# Patient Record
Sex: MALE | Race: WHITE | NOT HISPANIC OR LATINO | Employment: OTHER | URBAN - METROPOLITAN AREA
[De-identification: names, ages, dates, MRNs, and addresses within clinical notes are randomized per-mention and may not be internally consistent; named-entity substitution may affect disease eponyms.]

---

## 2017-01-12 ENCOUNTER — OFFICE VISIT (OUTPATIENT)
Dept: NEUROLOGY | Facility: CLINIC | Age: 82
End: 2017-01-12
Payer: MEDICARE

## 2017-01-12 VITALS — HEIGHT: 73 IN | BODY MASS INDEX: 28.49 KG/M2 | RESPIRATION RATE: 20 BRPM | WEIGHT: 215 LBS

## 2017-01-12 DIAGNOSIS — R26.9 GAIT DIFFICULTY: Primary | ICD-10-CM

## 2017-01-12 DIAGNOSIS — R41.81 AGE-RELATED COGNITIVE DECLINE: ICD-10-CM

## 2017-01-12 PROCEDURE — 99213 OFFICE O/P EST LOW 20 MIN: CPT | Mod: PBBFAC,PN | Performed by: PSYCHIATRY & NEUROLOGY

## 2017-01-12 PROCEDURE — 99204 OFFICE O/P NEW MOD 45 MIN: CPT | Mod: S$PBB,,, | Performed by: PSYCHIATRY & NEUROLOGY

## 2017-01-12 PROCEDURE — 99999 PR PBB SHADOW E&M-EST. PATIENT-LVL III: CPT | Mod: PBBFAC,,, | Performed by: PSYCHIATRY & NEUROLOGY

## 2017-01-12 NOTE — PROGRESS NOTES
Subjective:       Patient ID: Toni Uriarte is a 86 y.o. male.    Chief Complaint: Gait Problem (Not good balance); Headache; and Memory Loss (confusion)    HPI  The patient is a 87 y/o male presenting with 3 different symptoms. He first describes a very brief, short lasting, jabbing pain from front to back. There are no particular triggers nor ameliorating or worsening factors. He also complains of balance problems particularly when bending over. He practices squatting daily but this is not enough to control the tendency to fall when leaning forward. Lastly, he states that is having memory difficulty. His daughter accompanies him and reiterates the problems with memory. He has diagnoses of orthostatic dizziness, femoral and tibial artery stenosis, degenerative disease of the lumbar spine with very good response to Physical Therapy in the past.     Review of Systems   Constitutional: Negative for activity change, appetite change, chills, fatigue and fever.   HENT: Positive for hearing loss. Negative for congestion, dental problem, ear pain, sinus pressure, tinnitus, trouble swallowing and voice change.    Eyes: Positive for pain. Negative for photophobia and visual disturbance.   Respiratory: Negative for cough, chest tightness and shortness of breath.    Cardiovascular: Negative for chest pain, palpitations and leg swelling.   Gastrointestinal: Negative for abdominal pain, blood in stool, constipation, diarrhea, nausea and vomiting.   Endocrine: Positive for polyuria. Negative for cold intolerance and heat intolerance.   Genitourinary: Positive for frequency. Negative for difficulty urinating, dysuria and urgency.   Musculoskeletal: Negative for arthralgias, back pain, gait problem, myalgias, neck pain and neck stiffness.   Skin: Negative for color change, pallor and rash.   Neurological: Positive for dizziness and light-headedness. Negative for tremors, seizures, syncope, facial asymmetry, speech  difficulty, weakness, numbness and headaches.   Hematological: Negative for adenopathy. Does not bruise/bleed easily.   Psychiatric/Behavioral: Positive for confusion. Negative for agitation, behavioral problems, decreased concentration, self-injury, sleep disturbance and suicidal ideas. The patient is not nervous/anxious.          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    Urinary incontinence      occasional leakage of urine     Past Surgical History   Procedure Laterality Date    Gallbladder surgery  2007    Cataract extraction Bilateral     Tonsillectomy      Cholecystectomy      Eye surgery      Circumcision      Hemorrhoid surgery      Prostate surgery       Family History   Problem Relation Age of Onset    Cancer Mother      breast    Heart disease Mother     Heart disease Father     Stroke Father     Alzheimer's disease Brother     Arthritis Brother      back and neck     Social History     Social History    Marital status:      Spouse name: N/A    Number of children: N/A    Years of education: N/A     Occupational History    Not on file.     Social History Main Topics    Smoking status: Former Smoker     Quit date: 12/5/1985    Smokeless tobacco: Never Used    Alcohol use 0.0 oz/week     0 Standard drinks or equivalent per week      Comment: gin and tonic regularly    Drug use: No    Sexual activity: Not on file     Other Topics Concern    Not on file     Social History Narrative     Review of patient's allergies indicates:   Allergen Reactions    Demerol [meperidine] Other (See Comments)     arrest       Current Outpatient  Prescriptions:     aspirin (ECOTRIN) 81 MG EC tablet, Take 81 mg by mouth once daily.  , Disp: , Rfl:     co-enzyme Q-10 30 mg capsule, Take 30 mg by mouth 3 (three) times daily., Disp: , Rfl:     docusate sodium (COLACE) 100 MG capsule, Take 1 capsule (100 mg total) by mouth 2 (two) times daily as needed for Constipation., Disp: , Rfl: 0    gabapentin (NEURONTIN) 300 MG capsule, TAKE 1 CAPSULE(300 MG) BY MOUTH THREE TIMES DAILY, Disp: 270 capsule, Rfl: 1    LACTOBACILLUS ACIDOPHILUS (PROBIOTIC ORAL), Take by mouth., Disp: , Rfl:     levothyroxine (SYNTHROID) 100 MCG tablet, Take 1 tablet (100 mcg total) by mouth once daily., Disp: 90 tablet, Rfl: 3    metoclopramide HCl (REGLAN) 10 MG tablet, TAKE 1 TABLET TWICE DAILY, Disp: 180 tablet, Rfl: 0    multivitamin capsule, Take 1 capsule by mouth once daily.  , Disp: , Rfl:     omeprazole (PRILOSEC) 20 MG capsule, TAKE 2 CAPSULES BY MOUTH ONCE DAILY, Disp: 60 capsule, Rfl: 0      Objective:      Vitals:    01/12/17 0914   Resp: 20     Body mass index is 28.37 kg/(m^2).    Physical Exam    Constitutional:     He appears well-developed and well-nourished. He is well groomed  HENT:    Head: Atraumatic, oral and nasal mucosa intact  Eyes: Conjunctivae and EOM are normal. Pupils are equal, round, and reactive to light OU  Neck: Neck supple. No thyromegaly present  Cardiovascular: Normal rate and normal heart sounds  No murmur heard  Pulmonary/Chest: Effort normal and breath sounds normal  Musculoskeletal: Normal range of motion. No joint stiffness. No vertebral point tenderness  Skin: Skin is warm and dry  Psychiatric: Normal mood and affect     Neuro exam:  MOCA score 20/30  Mental status:  Awake, attentive, Alert, oriented to self, place, year and month  Language function is intact  Naming, repetition and spontaneous meaningful speech expression are intact    Cranial Nerves:  Smell was not formally evaluated  Cranial Nerves II - XII: intact  Pursuits were smooth,  normal saccades, no nystagmus OU  Funduscopic exam - disc were flat and pink, no exudates or hemorrhages OU  Motor - facial movement was symmetrical and normal     Palate moved well and was symmetrical with normal palatal and oral sensation  Tongue movements were full    Coordination:     Rapid alternating movements and rapid finger tapping - normal bilaterally  Finger to nose - normal and symmetric bilaterally    No intentional or positional tremor.     Motor:  Normal muscle bulk and symmetry. No fasciculations were noted    No pronator drift  Strength 5/5 bilaterally     Reflexes:  Tendon reflexes were 2 + at biceps, triceps, brachioradialis, patellar, and 1+ Achilles bilaterally  On plantar stimulation toes were down going bilaterally    Sensory: Intact to light touch, pin prick in all extremities. Vibration decreased distally to the ankles    Gait: Romberg absent. Normal gait. Normal arm swing and turns. Normal postural reflexes.         Assessment and Plan     The sporadic, jabbing pain is characteristic of Primary stabbing headache, no treatment necessary at this time    Balance problems: Abnormal gait, no definitive ataxia but in combination with cognitive decline and the incontinence I would like to rule out NPH. I have also ordered NCV/EMG as there is indication albeit subtle of peripheral neuropathy    Memory problems: will refer for neuropsychological evaluation and place him on medication depending upon tests results    Xuan Christianson M.D  Medical Director, Headache and Facial Pain  [facility]

## 2017-01-12 NOTE — MR AVS SNAPSHOT
[facility]  [address] [facility] [address]  [place] [address]  Phone: [phone]  Fax: [phone]                  Toni Uriarte   2017 9:00 AM   Office Visit    Description:  Male : 2/3/1930   Provider:  Noy Christianson MD   Department:  [facility]           Reason for Visit     Gait Problem     Headache     Memory Loss           Diagnoses this Visit        Comments    Gait difficulty    -  Primary     Age-related cognitive decline                To Do List           Future Appointments        Provider Department Dept Phone    2017 11:45 AM [facility] CT1 LIMIT 450 LBS [facility] [phone]      Goals (5 Years of Data)     None      [facility]     [facility] Nurse Care Line -  Assistance  Registered nurses in the [facility] provide clinical advisement, health education, appointment booking, and other advisory services.  Call for this free service at [phone].             Medications           Message regarding Medications     Verify the changes and/or additions to your medication regime listed below are the same as discussed with your clinician today.  If any of these changes or additions are incorrect, please notify your healthcare provider.             Verify that the below list of medications is an accurate representation of the medications you are currently taking.  If none reported, the list may be blank. If incorrect, please contact your healthcare provider. Carry this list with you in case of emergency.           Current Medications     aspirin (ECOTRIN) 81 MG EC tablet Take 81 mg by mouth once daily.      co-enzyme Q-10 30 mg capsule Take 30 mg by mouth 3 (three) times daily.    docusate sodium (COLACE) 100 MG capsule Take 1 capsule (100 mg total) by mouth 2 (two) times daily as needed for Constipation.    gabapentin (NEURONTIN) 300 MG capsule TAKE 1 CAPSULE(300 MG) BY MOUTH THREE TIMES DAILY    LACTOBACILLUS  "ACIDOPHILUS (PROBIOTIC ORAL) Take by mouth.    levothyroxine (SYNTHROID) 100 MCG tablet Take 1 tablet (100 mcg total) by mouth once daily.    metoclopramide HCl (REGLAN) 10 MG tablet TAKE 1 TABLET TWICE DAILY    multivitamin capsule Take 1 capsule by mouth once daily.      omeprazole (PRILOSEC) 20 MG capsule TAKE 2 CAPSULES BY MOUTH ONCE DAILY           Clinical Reference Information           Vital Signs - Last Recorded  Most recent update: 1/12/2017  9:14 AM by Cece Kinney LPN    Resp Ht Wt BMI       20 6' 1" (1.854 m) 97.5 kg (215 lb) 28.37 kg/m2       Allergies as of 1/12/2017     Demerol [Meperidine]      Immunizations Administered on Date of Encounter - 1/12/2017     None      Orders Placed During Today's Visit      Normal Orders This Visit    Ambulatory Referral to Physical/Occupational Therapy     Future Labs/Procedures Expected by Expires    CT Head Without Contrast  1/12/2017 1/12/2018    EMG - 2 Extremities  As directed 1/12/2018    Neuropsychological testing  As directed 1/12/2018      "

## 2017-01-12 NOTE — LETTER
January 12, 2017      Mariia Pinedo, FNP-C  [address] [facility] [address] [place] [address]           [facility]  [address] [facility] [place] [address]  Phone: [phone]  Fax: [phone]          Patient: Toni Uriarte   MR Number: [id number]   YOB: 1930   Date of Visit: 1/12/2017       Dear Mariia Pinedo:    Thank you for referring Toni Uriarte to me for evaluation. Attached you will find relevant portions of my assessment and plan of care.    If you have questions, please do not hesitate to call me. I look forward to following Toni Uriarte along with you.    Sincerely,    Noy Christianson MD    Enclosure  CC:  No Recipients    If you would like to receive this communication electronically, please contact [email or web address] or [phone] to request more information on [facility] access.    For providers and/or their staff who would like to refer a patient to [facility], please contact us through our one-stop-shop provider referral line, [facility], at [phone].    If you feel you have received this communication in error or would no longer like to receive these types of communications, please e-mail [email or web address]

## 2017-01-12 NOTE — PROGRESS NOTES
Headache questionnaire    1. When did your Headaches start?    1 year ago      2. Where are your headaches located?   All over      3. Your headache's characteristics:   Sharp      4. How long does the headache last?   Seconds      5. How often does the headache occur?   Once every 2-3 months      6. Are your headaches preceded or accompanied by other symptoms? no   If yes, please describe.        7. Does the headache awaken you at night? no   If so, how often?         8. Please anne the word that best describes your headache's intensity:    moderate      9. Using a scale of 1 through 10, with 0 = no pain and 10 = the worst pain:   What score is your headache now? 0   What score is your headache at its worst? 7   What score is your headache at its best? 0        10. Possible associated headache symptoms:  []  Sensitivity to light  [] Dizziness  [] Nasal or sinus pressure/ pain   [] Sensitivity to noise  [] Vertigo  [x] Problems with concentration  [] Sensitivity to smells  [] Ringing in ears  [] Problems with memory    [] Blurred vision  [] Irritability  [] Problems with task completion   [] Double vision  [] Anger  []  Problems with relaxation  [] Loss of appetite  [] Anxiety  [] Neck tightness, Neck pain  [] Nausea   [] Nasal congestion  [] Vomiting         11. Headache improving factors:  [x] Sleep  [] Heat  [] Darkness  [] Ice  [] Local pressure [] Menses (period)  [] Massage   [] Medications:        12. Headache worsening factors:   [] Fatigue [] Sneezing  [] Changes in Weather  [] Light [] Bending Over [x] Stress  [] Noise [] Ovulation  [] Multiple Sclerosis Flare-Up  [] Smells  [] Menses  [] Food   [] Coughing [] Alcohol      13. Number of caffeinated drinks per day: 4      14. Number of diet drinks per day:  0      15. Have you seen any other [facility] within the last 3 years?  yes

## 2017-01-13 ENCOUNTER — HOSPITAL ENCOUNTER (OUTPATIENT)
Dept: RADIOLOGY | Facility: HOSPITAL | Age: 82
Discharge: HOME | End: 2017-01-13
Attending: PSYCHIATRY & NEUROLOGY
Payer: MEDICARE

## 2017-01-13 DIAGNOSIS — R26.9 GAIT DIFFICULTY: ICD-10-CM

## 2017-01-13 PROCEDURE — 70450 CT HEAD/BRAIN W/O DYE: CPT | Mod: 26,,, | Performed by: RADIOLOGY

## 2017-01-13 PROCEDURE — 70450 CT HEAD/BRAIN W/O DYE: CPT | Mod: TC,PO

## 2017-02-24 ENCOUNTER — TELEPHONE (OUTPATIENT)
Dept: NEUROLOGY | Facility: CLINIC | Age: 82
End: 2017-02-24

## 2017-02-24 NOTE — TELEPHONE ENCOUNTER
Patient's daughter given the number to [facility] in [place] to call and schedule Neuropsych testing. She was also given the number to main campus incase the place in [place] does not take his insurance. Follow up appointment with Dr. Christianson made.

## 2017-02-24 NOTE — TELEPHONE ENCOUNTER
----- Message from Erika Lagos sent at 2/24/2017 10:09 AM CST -----  Contact: Jennifer Edwards (Daughter) [phone]  Jenniferhowie Edwards (Daughter) [phone] requesting a call from nurse stated nurse was suppose to schedule patient for a test weeks ago she never heard back from anyone.

## 2017-03-10 ENCOUNTER — OFFICE VISIT (OUTPATIENT)
Dept: NEUROLOGY | Facility: CLINIC | Age: 82
End: 2017-03-10
Payer: MEDICARE

## 2017-03-10 VITALS
HEIGHT: 73 IN | BODY MASS INDEX: 28.25 KG/M2 | WEIGHT: 213.19 LBS | SYSTOLIC BLOOD PRESSURE: 165 MMHG | RESPIRATION RATE: 20 BRPM | DIASTOLIC BLOOD PRESSURE: 69 MMHG | HEART RATE: 67 BPM

## 2017-03-10 DIAGNOSIS — R53.1 WEAKNESS: ICD-10-CM

## 2017-03-10 DIAGNOSIS — R20.0 NUMBNESS: Primary | ICD-10-CM

## 2017-03-10 DIAGNOSIS — R26.9 GAIT DIFFICULTY: Primary | ICD-10-CM

## 2017-03-10 DIAGNOSIS — R41.81 AGE-RELATED COGNITIVE DECLINE: ICD-10-CM

## 2017-03-10 DIAGNOSIS — R42 ORTHOSTATIC DIZZINESS: ICD-10-CM

## 2017-03-10 PROCEDURE — 99214 OFFICE O/P EST MOD 30 MIN: CPT | Mod: S$PBB,,, | Performed by: PSYCHIATRY & NEUROLOGY

## 2017-03-10 PROCEDURE — 99999 PR PBB SHADOW E&M-EST. PATIENT-LVL III: CPT | Mod: PBBFAC,,, | Performed by: PSYCHIATRY & NEUROLOGY

## 2017-03-10 PROCEDURE — 99213 OFFICE O/P EST LOW 20 MIN: CPT | Mod: PBBFAC,PN | Performed by: PSYCHIATRY & NEUROLOGY

## 2017-03-10 NOTE — PROGRESS NOTES
Subjective:       Patient ID: Toni Uriarte is a 86 y.o. male.    Chief Complaint: Gait Problem (Not good balance); Headache; and Memory Loss (confusion)  INTERVAL HISTORY    Primary stabbing headache resolved.   CT ruled out NPH.  Gait disturbance persists as well as memory problems. Awaiting NCV/EMG of LEs and Neuropsychological testing.  HPI  The patient is a 85 y/o male presenting with 3 different symptoms. He first describes a very brief, short lasting, jabbing pain from front to back. There are no particular triggers nor ameliorating or worsening factors. He also complains of balance problems particularly when bending over. He practices squatting daily but this is not enough to control the tendency to fall when leaning forward. Lastly, he states that is having memory difficulty. His daughter accompanies him and reiterates the problems with memory. He has diagnoses of orthostatic dizziness, femoral and tibial artery stenosis, degenerative disease of the lumbar spine with very good response to Physical Therapy in the past.     Review of Systems   Constitutional: Negative for activity change, appetite change, chills, fatigue and fever.   HENT: Positive for hearing loss. Negative for congestion, dental problem, ear pain, sinus pressure, tinnitus, trouble swallowing and voice change.    Eyes: Positive for pain. Negative for photophobia and visual disturbance.   Respiratory: Negative for cough, chest tightness and shortness of breath.    Cardiovascular: Negative for chest pain, palpitations and leg swelling.   Gastrointestinal: Negative for abdominal pain, blood in stool, constipation, diarrhea, nausea and vomiting.   Endocrine: Positive for polyuria. Negative for cold intolerance and heat intolerance.   Genitourinary: Positive for frequency. Negative for difficulty urinating, dysuria and urgency.   Musculoskeletal: Negative for arthralgias, back pain, gait problem, myalgias, neck pain and neck stiffness.    Skin: Negative for color change, pallor and rash.   Neurological: Positive for dizziness and light-headedness. Negative for tremors, seizures, syncope, facial asymmetry, speech difficulty, weakness, numbness and headaches.   Hematological: Negative for adenopathy. Does not bruise/bleed easily.   Psychiatric/Behavioral: Positive for confusion. Negative for agitation, behavioral problems, decreased concentration, self-injury, sleep disturbance and suicidal ideas. The patient is not nervous/anxious.          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    Urinary incontinence      occasional leakage of urine     Past Surgical History   Procedure Laterality Date    Gallbladder surgery  2007    Cataract extraction Bilateral     Tonsillectomy      Cholecystectomy      Eye surgery      Circumcision      Hemorrhoid surgery      Prostate surgery       Family History   Problem Relation Age of Onset    Cancer Mother      breast    Heart disease Mother     Heart disease Father     Stroke Father     Alzheimer's disease Brother     Arthritis Brother      back and neck     Social History     Social History    Marital status:      Spouse name: N/A    Number of children: N/A    Years of education: N/A     Occupational History    Not on file.     Social History Main Topics    Smoking status: Former Smoker     Quit date: 12/5/1985    Smokeless tobacco: Never Used    Alcohol use 0.0 oz/week     0 Standard drinks or equivalent per week      Comment: gin and tonic regularly    Drug use: No    Sexual activity: Not on file     Other Topics Concern    Not on file     Social  History Narrative     Review of patient's allergies indicates:   Allergen Reactions    Demerol [meperidine] Other (See Comments)     arrest       Current Outpatient Prescriptions:     aspirin (ECOTRIN) 81 MG EC tablet, Take 81 mg by mouth once daily.  , Disp: , Rfl:     co-enzyme Q-10 30 mg capsule, Take 30 mg by mouth 3 (three) times daily., Disp: , Rfl:     docusate sodium (COLACE) 100 MG capsule, Take 1 capsule (100 mg total) by mouth 2 (two) times daily as needed for Constipation., Disp: , Rfl: 0    gabapentin (NEURONTIN) 300 MG capsule, TAKE 1 CAPSULE(300 MG) BY MOUTH THREE TIMES DAILY, Disp: 270 capsule, Rfl: 1    LACTOBACILLUS ACIDOPHILUS (PROBIOTIC ORAL), Take by mouth., Disp: , Rfl:     levothyroxine (SYNTHROID) 100 MCG tablet, Take 1 tablet (100 mcg total) by mouth once daily., Disp: 90 tablet, Rfl: 3    metoclopramide HCl (REGLAN) 10 MG tablet, TAKE 1 TABLET TWICE DAILY, Disp: 180 tablet, Rfl: 0    multivitamin capsule, Take 1 capsule by mouth once daily.  , Disp: , Rfl:     omeprazole (PRILOSEC) 20 MG capsule, TAKE 2 CAPSULES BY MOUTH ONCE DAILY, Disp: 60 capsule, Rfl: 0      Objective:      Vitals:    03/10/17 1446   BP: (!) 165/69   Pulse: 67   Resp: 20         Physical Exam    Constitutional:     He appears well-developed and well-nourished. He is well groomed  HENT:    Head: Atraumatic, oral and nasal mucosa intact  Eyes: Conjunctivae and EOM are normal. Pupils are equal, round, and reactive to light OU  Neck: Neck supple. No thyromegaly present  Cardiovascular: Normal rate and normal heart sounds  No murmur heard  Pulmonary/Chest: Effort normal and breath sounds normal  Musculoskeletal: Normal range of motion. No joint stiffness. No vertebral point tenderness  Skin: Skin is warm and dry  Psychiatric: Normal mood and affect     Neuro exam:  MOCA score 20/30  Mental status:  Awake, attentive, Alert, oriented to self, place, year and month  Language function is intact  Naming, repetition and  spontaneous meaningful speech expression are intact    Cranial Nerves:  Smell was not formally evaluated  Cranial Nerves II - XII: intact  Pursuits were smooth, normal saccades, no nystagmus OU  Funduscopic exam - disc were flat and pink, no exudates or hemorrhages OU  Motor - facial movement was symmetrical and normal     Palate moved well and was symmetrical with normal palatal and oral sensation  Tongue movements were full    Coordination:     Rapid alternating movements and rapid finger tapping - normal bilaterally  Finger to nose - normal and symmetric bilaterally    No intentional or positional tremor.     Motor:  Normal tone, slightly decreased bulk  Strength 5/5 bilaterally except distal LE 4+/5     Reflexes:  Tendon reflexes were 2 + at biceps, triceps, brachioradialis, patellar, and 1+ Achilles bilaterally  On plantar stimulation toes were down going bilaterally    Sensory: Intact to light touch, pin prick in all extremities. Vibration decreased distally to the ankles    Gait: Ambulates with cane      Assessment and Plan     The sporadic, jabbing pain is characteristic of Primary stabbing headache, are resolved    Balance problems: Abnormal gait, no definitive ataxia but in combination with cognitive decline and the incontinence. CT ruled out NPH. I have also ordered NCV/EMG as there is indication albeit subtle of peripheral neuropathy    Memory problems: will refer for neuropsychological evaluation and place him on medication depending upon tests completed    High blood pressure noted. Will cc his PCP    RTC after tests completed    Counseling:  More than 50% of the 25 minute encounter was spent face to face counseling the patient regarding current status and future plan of care as well as side of the medications. All questions were answered to patient's satisfaction    Xuan Christianson M.D  Medical Director, Headache and Facial Pain  [facility]

## 2017-04-20 ENCOUNTER — TELEPHONE (OUTPATIENT)
Dept: NEUROLOGY | Facility: CLINIC | Age: 82
End: 2017-04-20

## 2017-04-20 NOTE — TELEPHONE ENCOUNTER
----- Message from Loretta Sen sent at 4/20/2017  1:50 PM CDT -----  Contact: [phone]  Margie (Dr. Méndez) called stated that pt is coming tomorrow for a procedure at 11 and she needs the last office note and recent MRI/pls fax to [phone]

## 2017-04-21 ENCOUNTER — NURSE TRIAGE (OUTPATIENT)
Dept: ADMINISTRATIVE | Facility: CLINIC | Age: 82
End: 2017-04-21

## 2017-04-21 ENCOUNTER — TELEPHONE (OUTPATIENT)
Dept: FAMILY MEDICINE | Facility: CLINIC | Age: 82
End: 2017-04-21

## 2017-04-21 NOTE — TELEPHONE ENCOUNTER
Called pt daughter, advised of Dr. Contreras message and she verbally understood. She said he does not have a plan he just has the thoughts. I stated he still needed to go to the ER and she stated he would not be happy about that. And she stated her sister is a  and they just had conversation about him saying he feels he would be better off gone. She requested an appt and scheduled him with pcp Monday but advised if it gets worse to seek ER asap and she verbally understood she stated they are not letting him be alone now.

## 2017-04-21 NOTE — TELEPHONE ENCOUNTER
Reason for Disposition   Sometimes has thoughts of suicide    Protocols used: ST DEPRESSION-A-[place]  Spoke to patient's daughter who states he is depressed.She states he told her sister his plans of suicide. Jennifer states family is with patient around the clock. Patient recently lost his wife on this Tuesday. His daughter Jennifer is requesting an antidepressant or something to assist patient with sleep.

## 2017-04-21 NOTE — TELEPHONE ENCOUNTER
----- Message from Amara Reno sent at 4/21/2017  2:53 PM CDT -----  Contact: Daughter  Francine, daughter [phone]. Calling about patients current state of mind and threatening to harm himself. Patient just lost his wife this past week. Please advise. Thanks.   Transferred to [facility] on call nurse for triage.

## 2017-04-21 NOTE — TELEPHONE ENCOUNTER
Please see Triage call below:            Reason for Disposition   Sometimes has thoughts of suicide    Protocols used: ST DEPRESSION-A-[place]  Spoke to patient's daughter who states he is depressed.She states he told her sister his plans of suicide. Jennifer states family is with patient around the clock. Patient recently lost his wife on this Tuesday. His daughter Jennifer is requesting an antidepressant or something to assist patient with sleep.

## 2017-04-24 ENCOUNTER — OFFICE VISIT (OUTPATIENT)
Dept: FAMILY MEDICINE | Facility: CLINIC | Age: 82
End: 2017-04-24
Payer: MEDICARE

## 2017-04-24 VITALS
SYSTOLIC BLOOD PRESSURE: 122 MMHG | BODY MASS INDEX: 27.7 KG/M2 | WEIGHT: 209 LBS | RESPIRATION RATE: 18 BRPM | HEART RATE: 80 BPM | HEIGHT: 73 IN | DIASTOLIC BLOOD PRESSURE: 70 MMHG

## 2017-04-24 DIAGNOSIS — F43.21 GRIEF REACTION: Primary | ICD-10-CM

## 2017-04-24 PROCEDURE — 99213 OFFICE O/P EST LOW 20 MIN: CPT | Mod: PBBFAC,PO | Performed by: FAMILY MEDICINE

## 2017-04-24 PROCEDURE — 99213 OFFICE O/P EST LOW 20 MIN: CPT | Mod: S$PBB,,, | Performed by: FAMILY MEDICINE

## 2017-04-24 PROCEDURE — 99999 PR PBB SHADOW E&M-EST. PATIENT-LVL III: CPT | Mod: PBBFAC,,, | Performed by: FAMILY MEDICINE

## 2017-04-25 ENCOUNTER — TELEPHONE (OUTPATIENT)
Dept: NEUROLOGY | Facility: CLINIC | Age: 82
End: 2017-04-25

## 2017-04-25 NOTE — TELEPHONE ENCOUNTER
----- Message from Annie Moore sent at 4/25/2017  9:59 AM CDT -----  Contact: Daughter Hillary David  She was trying to get results information and next appointment. Please call [phone]. Thanks!

## 2017-04-26 NOTE — PROGRESS NOTES
Subjective:       Patient ID: Toni Uriarte is a 87 y.o. male    Chief Complaint: Depression    HPI  Here to discuss grief after the recent death of his wife.  He has had some insomnia, tearfulness, and slightly withdrawn  Denies suicidal or homicidal ideation.  Slowly improving.  Less than 2 weeks since her death.    Review of Systems      Objective:   Physical Exam   MSE:  Decreased mood, tearful affect.  No suicidal or homicidal ideation.  No visual or auditory hallucinations.    Assessment:       1. Grief reaction           Plan:       Grief reaction  - Normal grieving, discussed counseling and family/jayy support.

## 2017-04-27 NOTE — TELEPHONE ENCOUNTER
----- Message from Anabela Valles sent at 4/26/2017  4:23 PM CDT -----  Daughter/Zayda is returning office call concerning the results of his tests. Please call back at [phone].

## 2017-05-04 ENCOUNTER — TELEPHONE (OUTPATIENT)
Dept: NEUROLOGY | Facility: CLINIC | Age: 82
End: 2017-05-04

## 2017-05-04 NOTE — TELEPHONE ENCOUNTER
----- Message from Karly Mclean sent at 5/3/2017 12:22 PM CDT -----  Contact: daughter Zayda   Patient's daughter Zayda would like to speak to a nurse regarding is a follow up appointment is needed and if he should take a driving test   Please call her  to advise.    Thanks

## 2017-05-08 RX ORDER — METOCLOPRAMIDE 10 MG/1
TABLET ORAL
Qty: 180 TABLET | Refills: 0 | Status: SHIPPED | OUTPATIENT
Start: 2017-05-08 | End: 2017-12-14

## 2017-05-11 ENCOUNTER — TELEPHONE (OUTPATIENT)
Dept: NEUROLOGY | Facility: CLINIC | Age: 82
End: 2017-05-11

## 2017-05-11 NOTE — TELEPHONE ENCOUNTER
"Returned call to Zayda to give EMG results of "Diagnosis of peripheral polyneuropathy confirmed. Continue Gabapentin" per Dr. Christianson. Also to answer any additional questions she had. No answer. Left a message to call.    "

## 2017-05-11 NOTE — TELEPHONE ENCOUNTER
----- Message from Ester Haynes sent at 5/10/2017  8:58 AM CDT -----  Contact: Daughter Liliam Priest   [phone]  Patient's daughter is returning the nurse phone call.Thanks!

## 2017-05-12 ENCOUNTER — TELEPHONE (OUTPATIENT)
Dept: NEUROLOGY | Facility: CLINIC | Age: 82
End: 2017-05-12

## 2017-05-12 NOTE — TELEPHONE ENCOUNTER
----- Message from Abraham Patton sent at 5/11/2017  4:27 PM CDT -----  Contact: Patient's bethany Thornton  Place call to pod,Patient's denissarahrachael Thornton called to advise that she received all the information that she needed. If any questions call back at [phone]. Thanks,

## 2017-05-16 ENCOUNTER — TELEPHONE (OUTPATIENT)
Dept: NEUROLOGY | Facility: CLINIC | Age: 82
End: 2017-05-16

## 2017-05-16 NOTE — TELEPHONE ENCOUNTER
Left message with Dr. Salinas nurse to fax neuro-psych results to our office so we may schedule follow up appt with Dr. Hodges. I left my contact information so she me contact me directly.

## 2017-08-23 DIAGNOSIS — H90.3 BILATERAL SENSORINEURAL HEARING LOSS: Primary | ICD-10-CM

## 2017-08-23 DIAGNOSIS — H91.93 HEARING DIFFICULTY, BILATERAL: Primary | ICD-10-CM

## 2017-08-24 ENCOUNTER — CLINICAL SUPPORT (OUTPATIENT)
Dept: AUDIOLOGY | Facility: CLINIC | Age: 82
End: 2017-08-24
Payer: MEDICARE

## 2017-08-24 NOTE — PROGRESS NOTES
Patient was seen in the hearing aid clinic.  He was scheduled to have an annual hearing evaluation today; however, an otoscopic screening revealed excessive cerumen AD.  It was recommended that the patient have the cerumen removed prior to the hearing evaluation.  The patient scheduled an appointment with our nurse practitioner to have the cerumen removed.  He scheduled a hearing evaluation and hearing aid check for the same day.  The retention hook was changed on the right and left hearing aid to help with retention of the hearing aids.  The wax filter was changed on the left hearing aid, and this was discussed with the patient.  Regular use of the hearing aids was recommended.

## 2017-08-30 ENCOUNTER — OFFICE VISIT (OUTPATIENT)
Dept: PODIATRY | Facility: CLINIC | Age: 82
End: 2017-08-30
Payer: MEDICARE

## 2017-08-30 VITALS — HEIGHT: 73 IN | BODY MASS INDEX: 27.7 KG/M2 | WEIGHT: 209 LBS

## 2017-08-30 DIAGNOSIS — G60.9 IDIOPATHIC PERIPHERAL NEUROPATHY: Primary | ICD-10-CM

## 2017-08-30 DIAGNOSIS — B35.1 ONYCHOMYCOSIS DUE TO DERMATOPHYTE: ICD-10-CM

## 2017-08-30 DIAGNOSIS — L84 CORN OR CALLUS: ICD-10-CM

## 2017-08-30 PROCEDURE — 99999 PR PBB SHADOW E&M-EST. PATIENT-LVL III: CPT | Mod: PBBFAC,,, | Performed by: PODIATRIST

## 2017-08-30 PROCEDURE — 1159F MED LIST DOCD IN RCRD: CPT | Mod: ,,, | Performed by: PODIATRIST

## 2017-08-30 PROCEDURE — 99204 OFFICE O/P NEW MOD 45 MIN: CPT | Mod: S$PBB,,, | Performed by: PODIATRIST

## 2017-08-30 PROCEDURE — 1126F AMNT PAIN NOTED NONE PRSNT: CPT | Mod: ,,, | Performed by: PODIATRIST

## 2017-08-30 PROCEDURE — 99213 OFFICE O/P EST LOW 20 MIN: CPT | Mod: PBBFAC,PO | Performed by: PODIATRIST

## 2017-08-30 NOTE — PROGRESS NOTES
Subjective:      Patient ID: Toni Uriarte is a 87 y.o. male.    Chief Complaint: Foot Pain (numbness and pain to both feet ) and Nail Care (Thick nails)      Toni Chavez is a 87 y.o. male who presents to the clinic for evaluation and treatment of high risk feet. Toni Chavze has a past medical history of Arthritis; Basal cell carcinoma; BPH (benign prostatic hypertrophy); Disorder of kidney and ureter; GERD (gastroesophageal reflux disease); Hyperlipidemia; Hypertension; Hypothyroid; VIKTORIA (obstructive sleep apnea); Snoring; Squamous cell carcinoma; and Urinary incontinence. The patient's chief complaint is long, thick toenails.  Denies being painful with wearing closed toe shoes.  Has not attempted to self treat.  Also, relates a recent diagnosis of peripheral neuropathy.  States symptoms have been present for around 1 year and more consistent with numbness.  Symptoms are below the level of the knee and more prevalent in the toes.  Denies this being associated with overt pain.  Has been taking gabapentin with no improvement in symptoms.  Relates concern as his balance is not as steady.  Attempts to exercise and ambulates with assistance of a cane.  Inquires as to potential treatment options.  Denies any additional pedal complaints.     PCP: Miguel Contreras MD    Date Last Seen by PCP: 4/26/17      Hemoglobin A1C   Date Value Ref Range Status   04/25/2007 5.0 4.5 - 6.2 % Final       Review of Systems   Constitution: Negative for chills and fever.   Cardiovascular: Negative for claudication and leg swelling.   Skin: Positive for color change, dry skin and nail changes.   Musculoskeletal: Positive for arthritis and joint pain.   Neurological: Positive for numbness. Negative for paresthesias.   Psychiatric/Behavioral: Negative for altered mental status.           Objective:      Physical Exam   Constitutional: He is oriented to person, place, and time. He appears well-developed and well-nourished. No  distress.   Cardiovascular:   Pulses:       Dorsalis pedis pulses are 2+ on the right side, and 2+ on the left side.        Posterior tibial pulses are 1+ on the right side, and 1+ on the left side.   CFT <3 seconds bilateral.  Pedal hair growth present bilateral.  Varicosities noted bilateral.  No bilateral lower extremity edema.  Toes are warm to touch bilateral.     Musculoskeletal: He exhibits no edema or tenderness.   Muscle strength 5/5 in all muscle groups bilateral.  No tenderness nor crepitation with ROM of foot/ankle joints bilateral.  No tenderness with palpation of bilateral foot and ankle.  Bilateral pes cavus foot type.  Bilateral semi-rigid contracture of toes 2-5.     Neurological: He is alert and oriented to person, place, and time. He has normal strength. A sensory deficit is present.   Protective sensation per [place]-Maria monofilament absent bilateral.    Vibratory sensation absent bilateral.    Light touch intact bilateral.   Skin: Skin is warm, dry and intact. Capillary refill takes less than 2 seconds. Lesion noted. No abrasion, no bruising, no burn, no ecchymosis, no laceration, no petechiae and no rash noted. He is not diaphoretic. No erythema. No pallor.   Peal skin has normal turgor, temperature, and texture bilateral.  Toenails x 10 appear thickened by 2 mm's, elongated by 2 mm's, and discolored with subungual debris.  Focal hyperkeratoses noted to bilateral sub 5th metatarsal head.   Examination of the skin reveals no evidence of significant maceration, rashes, open lesions, suspicious appearing nevi or other concerning lesions.              Assessment:       Encounter Diagnoses   Name Primary?    Idiopathic peripheral neuropathy Yes    Corn or callus     Onychomycosis due to dermatophyte          Plan:       Toni Chavez was seen today for foot pain and nail care.    Diagnoses and all orders for this visit:    Idiopathic peripheral neuropathy    Corn or callus    Onychomycosis  due to dermatophyte      I counseled the patient on his conditions, their implications and medical management.    With the patient's verbal consent, a sterile nail nipper was used to trim toenails x 10 down to their soft tissue attachments without incident.  Also, a sterile #15 scalpel was used to trim lesions x 2 down to smooth appearing skin without incident.  Patient tolerated this quite well.    Advised to moisturize site of callus build up regularly.    Recommend continued strength training at the gym, specifically hamstring and quad exercises, to help build a stronger more stable foundation for both standing and ambulation.  Also, mentioned referral back to physical therapy for continued proprioceptive training.  Patient would like to defer this referral until after he has finished several sessions with a  at his gym.    Advised to use the assistive device while weight bearing, specifically on days when he feels unsteady.    Recommend wearing shoes with a stiff midsole to help with ambulation.    Discussed trimming nails as a Procedure Brief between visits.  Patient is amenable to this course of action.    Instructed to inspect feet daily for signs of localized infection or skin breakdown.    RTC in 1 year for routine follow up.    Celso Quinteros DPM

## 2017-09-06 ENCOUNTER — CLINICAL SUPPORT (OUTPATIENT)
Dept: AUDIOLOGY | Facility: CLINIC | Age: 82
End: 2017-09-06
Payer: MEDICARE

## 2017-09-06 ENCOUNTER — OFFICE VISIT (OUTPATIENT)
Dept: OTOLARYNGOLOGY | Facility: CLINIC | Age: 82
End: 2017-09-06
Payer: MEDICARE

## 2017-09-06 VITALS
HEART RATE: 73 BPM | SYSTOLIC BLOOD PRESSURE: 166 MMHG | DIASTOLIC BLOOD PRESSURE: 76 MMHG | HEIGHT: 73 IN | BODY MASS INDEX: 25.86 KG/M2 | WEIGHT: 195.13 LBS

## 2017-09-06 DIAGNOSIS — H61.23 BILATERAL IMPACTED CERUMEN: ICD-10-CM

## 2017-09-06 DIAGNOSIS — H92.03 EAR PAIN, BILATERAL: Primary | ICD-10-CM

## 2017-09-06 DIAGNOSIS — H90.3 BILATERAL SENSORINEURAL HEARING LOSS: Primary | ICD-10-CM

## 2017-09-06 DIAGNOSIS — Z97.4 WEARS HEARING AID: ICD-10-CM

## 2017-09-06 PROCEDURE — 99999 PR PBB SHADOW E&M-EST. PATIENT-LVL III: CPT | Mod: PBBFAC,,, | Performed by: NURSE PRACTITIONER

## 2017-09-06 PROCEDURE — 99213 OFFICE O/P EST LOW 20 MIN: CPT | Mod: 25,S$PBB,, | Performed by: NURSE PRACTITIONER

## 2017-09-06 PROCEDURE — 99213 OFFICE O/P EST LOW 20 MIN: CPT | Mod: PBBFAC,PO | Performed by: NURSE PRACTITIONER

## 2017-09-06 PROCEDURE — 92557 COMPREHENSIVE HEARING TEST: CPT | Mod: PBBFAC,PO | Performed by: AUDIOLOGIST

## 2017-09-06 PROCEDURE — G0268 REMOVAL OF IMPACTED WAX MD: HCPCS | Mod: S$PBB,,, | Performed by: NURSE PRACTITIONER

## 2017-09-06 PROCEDURE — 1126F AMNT PAIN NOTED NONE PRSNT: CPT | Mod: ,,, | Performed by: NURSE PRACTITIONER

## 2017-09-06 PROCEDURE — 99999 PR PBB SHADOW E&M-EST. PATIENT-LVL I: CPT | Mod: PBBFAC,,,

## 2017-09-06 PROCEDURE — 99211 OFF/OP EST MAY X REQ PHY/QHP: CPT | Mod: PBBFAC,27,PO

## 2017-09-06 PROCEDURE — 1159F MED LIST DOCD IN RCRD: CPT | Mod: ,,, | Performed by: NURSE PRACTITIONER

## 2017-09-06 PROCEDURE — 92567 TYMPANOMETRY: CPT | Mod: PBBFAC,PO | Performed by: AUDIOLOGIST

## 2017-09-06 PROCEDURE — G0268 REMOVAL OF IMPACTED WAX MD: HCPCS | Mod: PBBFAC,PO | Performed by: NURSE PRACTITIONER

## 2017-09-06 NOTE — PROGRESS NOTES
Subjective:       Patient ID: Toni Uriarte is a 87 y.o. male.    Chief Complaint: Cerumen Impaction and Hearing Loss (clean ears prior to audio)    Hearing Loss: No ear pain, no fever and no rhinorrhea.     Patient seen by me one year ago for hearing loss. He returns today for annual audiogram. He needs ear cleaning, according to Blanca, his audiologist.     Review of Systems   Constitutional: Negative.  Negative for fatigue and fever.   HENT: Positive for hearing loss. Negative for ear pain, rhinorrhea and sore throat.    Eyes: Negative.    Respiratory: Negative.  Negative for cough, shortness of breath and wheezing.    Cardiovascular: Negative.    Gastrointestinal: Negative.  Negative for nausea.   Musculoskeletal: Negative.  Negative for gait problem.   Skin: Negative.  Negative for color change, pallor and rash.   Neurological: Negative.    Hematological: Negative.  Negative for adenopathy.   Psychiatric/Behavioral: Negative.  Negative for agitation.       Objective:      Physical Exam   Constitutional: He is oriented to person, place, and time. Vital signs are normal. He appears well-developed and well-nourished. He is cooperative. He does not appear ill. No distress.   Ambulates slowly with a cane   HENT:   Head: Normocephalic and atraumatic.   Right Ear: Hearing, tympanic membrane, external ear and ear canal normal. Tympanic membrane is not erythematous. No middle ear effusion.   Left Ear: Hearing, tympanic membrane, external ear and ear canal normal. Tympanic membrane is not erythematous.  No middle ear effusion.   Nose: Nose normal. No mucosal edema, rhinorrhea or septal deviation. Right sinus exhibits no maxillary sinus tenderness and no frontal sinus tenderness. Left sinus exhibits no maxillary sinus tenderness and no frontal sinus tenderness.   Mouth/Throat: Uvula is midline, oropharynx is clear and moist and mucous membranes are normal. Mucous membranes are not pale, not dry and not cyanotic. No  oral lesions. No oropharyngeal exudate, posterior oropharyngeal edema or posterior oropharyngeal erythema.     SEPARATE PROCEDURE IN OFFICE:   Procedure: Removal of impacted cerumen, bilateral   Pre Procedure Diagnosis: Cerumen Impaction   Post Procedure Diagnosis: Cerumen Impaction   Verbal informed consent in regards to risk of trauma to ear canal, ear drum or hearing, discomfort during procedure and/or inability to remove cerumen impaction in one session or unforeseen events or complications.   No anesthesia.     Procedure in detail:   Ear canal visualized bilateral with appropriate size ear speculum utilizing Operating Head Binocular Otomicroscope   Utilizing the following: Ring curet and alligator forceps AU. The impacted cerumen of the ear canals was removed atraumatically. The TM and EAC were then inspected and found to be clear of wax. See description of TMs/EACs in PE above.   Complications: No   Condition: Improved/Good     Eyes: Conjunctivae, EOM and lids are normal. Pupils are equal, round, and reactive to light. Right eye exhibits no discharge. Left eye exhibits no discharge. No scleral icterus.   Neck: Trachea normal and normal range of motion. Neck supple. No tracheal deviation present. No thyroid mass and no thyromegaly present.   Cardiovascular: Normal rate.    Pulmonary/Chest: Effort normal. No stridor. No respiratory distress. He has no wheezes.   Musculoskeletal: Normal range of motion.   Lymphadenopathy:        Head (right side): No submental, no submandibular, no tonsillar, no preauricular and no posterior auricular adenopathy present.        Head (left side): No submental, no submandibular, no tonsillar, no preauricular and no posterior auricular adenopathy present.     He has no cervical adenopathy.        Right cervical: No superficial cervical and no posterior cervical adenopathy present.       Left cervical: No superficial cervical and no posterior cervical adenopathy present.    Neurological: He is alert and oriented to person, place, and time. He has normal strength. Coordination and gait normal.   Skin: Skin is warm, dry and intact. No lesion and no rash noted. He is not diaphoretic. No cyanosis. No pallor.   Psychiatric: He has a normal mood and affect. His speech is normal and behavior is normal. Judgment and thought content normal. Cognition and memory are normal.   Nursing note and vitals reviewed.    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

## 2017-09-06 NOTE — PROGRESS NOTES
Audiological evaluation completed per order from  Lizy Salcido due to patient's history of bilateral hearing loss and use of binaural amplification.     Audiogram results were reviewed in detail with patient and all questions were answered.      Recommend continued daily use of binaural amplification and annual audiogram to monitor hearing loss.

## 2017-09-06 NOTE — PROGRESS NOTES
The patient was seen for a hearing aid follow-up appointment after having a hearing evaluation performed today.  Today's hearing thresholds were entered into the hearing aid software, and the programming for the left and right hearing aid was recalculated using the current hearing thresholds.  The right and left hearing aid was cleaned and checked.  A listening check revealed each aid to sound good.  The patient was informed that he could renew the repair warranty/loss & damage policy, and the expiration date for these policies was given to the patient.  An annual audiological evaluation was recommended.  The patient will contact us as needed.

## 2017-10-05 DIAGNOSIS — M51.36 DDD (DEGENERATIVE DISC DISEASE), LUMBAR: ICD-10-CM

## 2017-10-05 RX ORDER — GABAPENTIN 300 MG/1
CAPSULE ORAL
Qty: 270 CAPSULE | Refills: 2 | Status: SHIPPED | OUTPATIENT
Start: 2017-10-05 | End: 2018-02-16 | Stop reason: SDUPTHER

## 2017-12-14 ENCOUNTER — OFFICE VISIT (OUTPATIENT)
Dept: PODIATRY | Facility: CLINIC | Age: 82
End: 2017-12-14
Payer: MEDICARE

## 2017-12-14 VITALS — HEIGHT: 73 IN | BODY MASS INDEX: 26.21 KG/M2 | WEIGHT: 197.75 LBS

## 2017-12-14 DIAGNOSIS — L84 CORN OR CALLUS: ICD-10-CM

## 2017-12-14 DIAGNOSIS — L08.1 ERYTHRASMA: ICD-10-CM

## 2017-12-14 DIAGNOSIS — G60.9 IDIOPATHIC PERIPHERAL NEUROPATHY: Primary | ICD-10-CM

## 2017-12-14 DIAGNOSIS — B35.1 ONYCHOMYCOSIS DUE TO DERMATOPHYTE: ICD-10-CM

## 2017-12-14 PROCEDURE — 11056 PARNG/CUTG B9 HYPRKR LES 2-4: CPT | Mod: Q9,S$PBB,, | Performed by: PODIATRIST

## 2017-12-14 PROCEDURE — 99213 OFFICE O/P EST LOW 20 MIN: CPT | Mod: PBBFAC,PO | Performed by: PODIATRIST

## 2017-12-14 PROCEDURE — 11056 PARNG/CUTG B9 HYPRKR LES 2-4: CPT | Mod: PBBFAC,PO | Performed by: PODIATRIST

## 2017-12-14 PROCEDURE — 11721 DEBRIDE NAIL 6 OR MORE: CPT | Mod: 59,Q9,S$PBB, | Performed by: PODIATRIST

## 2017-12-14 PROCEDURE — 99999 PR PBB SHADOW E&M-EST. PATIENT-LVL III: CPT | Mod: PBBFAC,,, | Performed by: PODIATRIST

## 2017-12-14 PROCEDURE — 11721 DEBRIDE NAIL 6 OR MORE: CPT | Mod: PBBFAC,59,PO | Performed by: PODIATRIST

## 2017-12-14 PROCEDURE — 99499 UNLISTED E&M SERVICE: CPT | Mod: S$PBB,,, | Performed by: PODIATRIST

## 2017-12-14 RX ORDER — METOCLOPRAMIDE 10 MG/1
10 TABLET ORAL
Status: ON HOLD | COMMUNITY
End: 2018-05-02 | Stop reason: HOSPADM

## 2017-12-14 RX ORDER — CLINDAMYCIN PHOSPHATE 10 MG/G
GEL TOPICAL 2 TIMES DAILY
Qty: 1 TUBE | Refills: 0 | Status: SHIPPED | OUTPATIENT
Start: 2017-12-14 | End: 2017-12-15

## 2017-12-14 NOTE — PROGRESS NOTES
Subjective:      Patient ID: Toni Uriarte is a 87 y.o. male.    Chief Complaint: Foot Pain (both - and numbness); Nail Care; and Other Misc (PCP:  Dr Zabala  4/24/17)      Toni Chavez is a 87 y.o. male who presents to the clinic for evaluation and treatment of high risk feet. Toni Chavez has a past medical history of Arthritis; Basal cell carcinoma; BPH (benign prostatic hypertrophy); Disorder of kidney and ureter; GERD (gastroesophageal reflux disease); Hyperlipidemia; Hypertension; Hypothyroid; VIKTORIA (obstructive sleep apnea); Snoring; Squamous cell carcinoma; and Urinary incontinence. The patient's chief complaint is long, thick toenails.  Denies being painful with wearing closed toe shoes.  Has not attempted to self treat.  Also, relates having painful calluses of bilateral plantar foot that emit sharp pain with weight bearing.  He currently rates pain as a 3/10.  Has not attempted to self treat.  Relates continued numbness to bilateral lower extremity that is unchanged when compared to the prior exam.  Denies any additional pedal complaints.     PCP: Miguel Contreras MD    Date Last Seen by PCP: 4/24/17      Hemoglobin A1C   Date Value Ref Range Status   04/25/2007 5.0 4.5 - 6.2 % Final       Review of Systems   Constitution: Negative for chills and fever.   Cardiovascular: Negative for claudication and leg swelling.   Skin: Positive for color change, dry skin and nail changes.   Musculoskeletal: Positive for arthritis and joint pain.   Neurological: Positive for numbness. Negative for paresthesias.   Psychiatric/Behavioral: Negative for altered mental status.           Objective:      Physical Exam   Constitutional: He is oriented to person, place, and time. He appears well-developed and well-nourished. No distress.   Cardiovascular:   Pulses:       Dorsalis pedis pulses are 2+ on the right side, and 2+ on the left side.        Posterior tibial pulses are 1+ on the right side, and 1+ on the left  side.   CFT <3 seconds bilateral.  Pedal hair growth present bilateral.  Varicosities noted bilateral.  No bilateral lower extremity edema.  Toes are warm to touch bilateral.     Musculoskeletal: He exhibits tenderness. He exhibits no edema.   Muscle strength 5/5 in all muscle groups bilateral.  No tenderness nor crepitation with ROM of foot/ankle joints bilateral.  Pain with palpation of bilateral sub 5th metatarsal head lesions.  Bilateral pes cavus foot type.  Bilateral semi-rigid contracture of toes 2-5.     Neurological: He is alert and oriented to person, place, and time. He has normal strength. A sensory deficit is present.   Protective sensation per [place]-Maria monofilament absent bilateral.    Vibratory sensation absent bilateral.    Light touch intact bilateral.   Skin: Skin is warm, dry and intact. Capillary refill takes less than 2 seconds. Lesion noted. No abrasion, no bruising, no burn, no ecchymosis, no laceration, no petechiae and no rash noted. He is not diaphoretic. No erythema. No pallor.   Peal skin has normal turgor, temperature, and texture bilateral.  Toenails x 10 appear thickened by 2 mm's, elongated by 2 mm's, and discolored with subungual debris.  Focal hyperkeratoses noted to bilateral sub 5th metatarsal head.   Interdigital maceration noted to bilateral 3rd and 4th webspace that fluoresces pink under a Wood's lamp, consistent with erythrasma.  No break in adjacent skin integrity.             Assessment:       Encounter Diagnoses   Name Primary?    Idiopathic peripheral neuropathy Yes    Corn or callus     Onychomycosis due to dermatophyte     Erythrasma          Plan:       Toni Chavez was seen today for foot pain, nail care and other misc.    Diagnoses and all orders for this visit:    Idiopathic peripheral neuropathy    Corn or callus    Onychomycosis due to dermatophyte    Erythrasma  -     clindamycin phosphate 1% (CLINDAGEL) 1 % gel; Apply topically 2 (two) times  daily.      I counseled the patient on his conditions, their implications and medical management.    With the patient's verbal consent, a sterile nail nipper was used to trim toenails x 10 down to their soft tissue attachments without incident.  Also, a sterile #15 scalpel was used to trim lesions x 2 down to smooth appearing skin without incident.  Patient tolerated this quite well.    Advised to moisturize site of callus build up regularly.    Prescription written for 1% clindamycin gel to be applied BID to bilateral 3rd and 4th webspaces x 2 weeks to address erythrasma.    Instructed to inspect feet daily for signs of localized infection or skin breakdown.    RTC in 4 months for routine follow up.    Celso Quinteros DPM

## 2017-12-15 ENCOUNTER — TELEPHONE (OUTPATIENT)
Dept: PODIATRY | Facility: CLINIC | Age: 82
End: 2017-12-15

## 2017-12-15 DIAGNOSIS — L08.1 ERYTHRASMA: Primary | ICD-10-CM

## 2017-12-15 RX ORDER — ERYTHROMYCIN 20 MG/G
GEL TOPICAL 2 TIMES DAILY
Qty: 60 G | Refills: 2 | Status: SHIPPED | OUTPATIENT
Start: 2017-12-15 | End: 2018-03-29

## 2017-12-15 NOTE — TELEPHONE ENCOUNTER
----- Message from Nighat Hollis sent at 12/15/2017  8:53 AM CST -----  Contact: daughter-Jennifer Edwards  Pt daughter-Jennifer Edwards calling states 1. Would like to the dr to go ahead and order physical therapy(pt has one like to go to)2. The bacteria gel that was ordered yesterday is 19 hundred dollars and needs something else called in....[phone]          .  [facility] [address] [place] [address] & [address] [address]  Phone: [phone] Fax: [phone]

## 2017-12-19 ENCOUNTER — TELEPHONE (OUTPATIENT)
Dept: PODIATRY | Facility: CLINIC | Age: 82
End: 2017-12-19

## 2017-12-19 NOTE — TELEPHONE ENCOUNTER
----- Message from Shaye Aparicio sent at 12/19/2017  8:57 AM CST -----  Contact: Patient  Toni, patient [phone] patient will be gone most of the day, call patient after 1 pm. Patient states he was supposed to get authorization for rehab, if possible he would like as soon as possible. Also, wants aquatic rehab at [facility]. Please advise. Thanks.

## 2017-12-19 NOTE — TELEPHONE ENCOUNTER
----- Message from Shaye Aparicio sent at 12/19/2017  8:57 AM CST -----  Contact: Patient  Toni, patient [phone] patient will be gone most of the day, call patient after 1 pm. Patient states he was supposed to get authorization for rehab, if possible he would like as soon as possible. Also, wants aquatic rehab at [facility]. Please advise. Thanks.

## 2017-12-20 ENCOUNTER — TELEPHONE (OUTPATIENT)
Dept: PODIATRY | Facility: CLINIC | Age: 82
End: 2017-12-20

## 2017-12-20 DIAGNOSIS — R26.81 GAIT INSTABILITY: ICD-10-CM

## 2017-12-20 DIAGNOSIS — G60.9 IDIOPATHIC PERIPHERAL NEUROPATHY: Primary | ICD-10-CM

## 2017-12-20 NOTE — TELEPHONE ENCOUNTER
----- Message from Rhina Reyes sent at 12/20/2017 12:11 PM CST -----  Please approve physical therapy orders  / [facility] fax [phone] .. He has a first appt on 01/11/18 at 9:30 .. Please call pt at [phone]

## 2017-12-20 NOTE — TELEPHONE ENCOUNTER
Spoke with patient, notified him that new prescription sent in per  to replace the expensive one, also orders are written for physical therapy, Verbalized understanding.

## 2017-12-20 NOTE — TELEPHONE ENCOUNTER
----- Message from Madeleine Palacio sent at 12/19/2017  4:11 PM CST -----  Contact: Patient  Patient states that he would like orders put in the system very soon for Aquatic Rehab at [facility] because of the insurance.  [facility]'s phone number is [phone].  The insurance will approve it and then when the next insurance kicks in the order will already be approved.  Can you please look in to this matter and call the patient back at [phone].  Thank you

## 2018-01-02 ENCOUNTER — TELEPHONE (OUTPATIENT)
Dept: PODIATRY | Facility: CLINIC | Age: 83
End: 2018-01-02

## 2018-01-02 NOTE — TELEPHONE ENCOUNTER
----- Message from Shaye Aparicio sent at 1/2/2018 10:31 AM CST -----  Contact: Patient  Toni, patient [phone] calling because he wants to know the status of the Aquatic Therapy orders. Please advise. Thanks.

## 2018-01-02 NOTE — TELEPHONE ENCOUNTER
Patient wanted orders faxed to [facility] fax # [phone]. Orders faxed as patient requested.

## 2018-01-04 DIAGNOSIS — M25.561 ACUTE PAIN OF RIGHT KNEE: Primary | ICD-10-CM

## 2018-01-05 ENCOUNTER — HOSPITAL ENCOUNTER (OUTPATIENT)
Dept: RADIOLOGY | Facility: HOSPITAL | Age: 83
Discharge: HOME | End: 2018-01-05
Attending: ORTHOPAEDIC SURGERY
Payer: MEDICARE

## 2018-01-05 ENCOUNTER — OFFICE VISIT (OUTPATIENT)
Dept: ORTHOPEDICS | Facility: CLINIC | Age: 83
End: 2018-01-05
Payer: MEDICARE

## 2018-01-05 VITALS — HEIGHT: 73 IN | BODY MASS INDEX: 26.11 KG/M2 | WEIGHT: 197 LBS

## 2018-01-05 DIAGNOSIS — M25.561 ACUTE PAIN OF RIGHT KNEE: Primary | ICD-10-CM

## 2018-01-05 DIAGNOSIS — M17.11 PRIMARY OSTEOARTHRITIS OF RIGHT KNEE: ICD-10-CM

## 2018-01-05 DIAGNOSIS — M25.561 ACUTE PAIN OF RIGHT KNEE: ICD-10-CM

## 2018-01-05 PROCEDURE — 99999 PR PBB SHADOW E&M-EST. PATIENT-LVL II: CPT | Mod: PBBFAC,,, | Performed by: ORTHOPAEDIC SURGERY

## 2018-01-05 PROCEDURE — 73560 X-RAY EXAM OF KNEE 1 OR 2: CPT | Mod: 26,59,LT, | Performed by: RADIOLOGY

## 2018-01-05 PROCEDURE — 99203 OFFICE O/P NEW LOW 30 MIN: CPT | Mod: S$GLB,,, | Performed by: ORTHOPAEDIC SURGERY

## 2018-01-05 PROCEDURE — 73560 X-RAY EXAM OF KNEE 1 OR 2: CPT | Mod: TC,FY,PO,LT

## 2018-01-05 PROCEDURE — 73562 X-RAY EXAM OF KNEE 3: CPT | Mod: 26,RT,, | Performed by: RADIOLOGY

## 2018-01-05 PROCEDURE — 73562 X-RAY EXAM OF KNEE 3: CPT | Mod: TC,FY,PO,RT

## 2018-01-05 NOTE — PROGRESS NOTES
87-year-old, complaining of pain in his right knee down into his leg for about   three weeks' time.  No trauma, came on gradually, burning, throbbing pain, 6/10   on good days, 8/10 on bad days, particularly bothersome with weightbearing, not   tried any formal treatment.    Exam today shows he is tender throughout the knee and leg.  No signs of   infection.  No swelling.  No instability.  Hip range of motion is painless, well   perfused distally.    X-rays show degenerative changes.    ASSESSMENT:  Degenerative disease of the knee.    PLAN:  Symptomatic care and reassurance, bracing as needed.  Long-term   strengthening.  Follow up as needed.      PBB/HN  dd: 01/05/2018 14:36:15 (CST)  td: 01/06/2018 06:36:03 (CST)  Doc ID   #[id number]  Job ID #[id number]    CC:     Further History  Aching pain  Worse with activity  Relieved with rest  No other associated symptoms  No other radiation    Further Exam  Alert and oriented  Pleasant  Contralateral limb has appropriate range of motion for age and condition  Contralateral limb has appropriate strength for age and condition  Contralateral limb has appropriate stability  for age and condition  No adenopathy  Pulses are appropriate for current condition  Skin is intact        Chief Complaint    Chief Complaint   Patient presents with    Right Knee - Pain       HPI  Toni Uriarte is a 87 y.o.  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    erythromycin with ethanol (EMGEL) 2 % gel Apply topically 2 (two) times daily.    FLUZONE HIGH-DOSE 2017-18, PF, 180 mcg/0.5 mL vaccine ADM 0.5ML IM UTD    gabapentin (NEURONTIN) 300 MG capsule TAKE 1 CAPSULE(300 MG) BY MOUTH THREE TIMES DAILY    levothyroxine (SYNTHROID) 100 MCG tablet Take 1 tablet (100 mcg total) by mouth once daily.    metoclopramide HCl (REGLAN) 10 MG tablet Take 10 mg by mouth 4 (four) times daily.    OMEPRAZOLE (PRILOSEC ORAL) Take by mouth once daily.     No current facility-administered medications for this visit.        Allergies  Review of patient's allergies indicates:   Allergen Reactions    Demerol [meperidine] Other (See Comments)     arrest       Family History  Family History   Problem Relation Age of Onset    Cancer Mother      breast    Heart disease Mother     Heart disease Father     Stroke Father     Alzheimer's disease Brother     Arthritis Brother      back and neck       Social History  Social History     Social History    Marital status:      Spouse name: N/A    Number of children: N/A    Years of education: N/A     Occupational History    Not on file.     Social History Main Topics    Smoking status: Former Smoker     Quit date: 12/5/1985    Smokeless tobacco: Never Used    Alcohol use 0.0 oz/week      Comment: gin and tonic regularly    Drug use: No    Sexual activity: Not on file     Other Topics Concern    Not on file     Social History Narrative    No narrative on file               Review of Systems     Constitutional: Negative    HENT: Negative  Eyes:  Negative  Respiratory: Negative  Cardiovascular: Negative  Musculoskeletal: HPI  Skin: Negative  Neurological: Negative  Hematological: Negative  Endocrine: Negative                 Physical Exam    There were no vitals filed for this visit.  Body mass index is 25.99 kg/m².  Physical Examination:     General appearance -  well appearing, and in no distress  Mental status - awake  Neck - supple  Chest -  symmetric air entry  Heart - normal rate   Abdomen - soft      Assessment     1. Acute pain of right knee    2. Primary osteoarthritis of right knee          Plan

## 2018-01-09 ENCOUNTER — TELEPHONE (OUTPATIENT)
Dept: PODIATRY | Facility: CLINIC | Age: 83
End: 2018-01-09

## 2018-01-09 NOTE — TELEPHONE ENCOUNTER
----- Message from Abraham Patton sent at 1/9/2018 11:31 AM CST -----  Contact: patient  Place call to pod,patient called regarding is authorization has been approved for the physical therapy. He stated that he doesn't want to go if insurance haven't approved it yet? Please call back to advise at [phone]. Thanks,

## 2018-01-10 ENCOUNTER — TELEPHONE (OUTPATIENT)
Dept: FAMILY MEDICINE | Facility: CLINIC | Age: 83
End: 2018-01-10

## 2018-01-10 NOTE — TELEPHONE ENCOUNTER
----- Message from Keya Holley sent at 1/10/2018 12:01 PM CST -----  Contact: Margie  Daughter called regarding physical therapy order, need an authorization.  Stating the patient has an appt tomorrow.Contacted the patient's insurance company, [employer] they have no record. Wanted to know who sent the authorization and to who, was it sent to his original Medicare he had back in January?? Please contact [phone]

## 2018-01-10 NOTE — TELEPHONE ENCOUNTER
----- Message from Shaye TWINLINX sent at 1/10/2018 12:18 PM CST -----  Contact: Daughter  Jennifer Edwards, daughter [phone] states she left a message earlier, and to disregard that. No phone call needed. Thanks.

## 2018-02-05 ENCOUNTER — OFFICE VISIT (OUTPATIENT)
Dept: FAMILY MEDICINE | Facility: CLINIC | Age: 83
End: 2018-02-05
Payer: MEDICARE

## 2018-02-05 VITALS
HEART RATE: 72 BPM | HEIGHT: 73 IN | SYSTOLIC BLOOD PRESSURE: 150 MMHG | BODY MASS INDEX: 25.56 KG/M2 | DIASTOLIC BLOOD PRESSURE: 70 MMHG | WEIGHT: 192.88 LBS

## 2018-02-05 DIAGNOSIS — I10 ESSENTIAL HYPERTENSION: ICD-10-CM

## 2018-02-05 DIAGNOSIS — E03.9 HYPOTHYROIDISM, UNSPECIFIED TYPE: Primary | ICD-10-CM

## 2018-02-05 DIAGNOSIS — Z00.00 PHYSICAL EXAM: ICD-10-CM

## 2018-02-05 PROCEDURE — 99999 PR PBB SHADOW E&M-EST. PATIENT-LVL III: CPT | Mod: PBBFAC,,, | Performed by: PHYSICIAN ASSISTANT

## 2018-02-05 PROCEDURE — 99397 PER PM REEVAL EST PAT 65+ YR: CPT | Mod: S$GLB,,, | Performed by: PHYSICIAN ASSISTANT

## 2018-02-05 PROCEDURE — 3008F BODY MASS INDEX DOCD: CPT | Mod: S$GLB,,, | Performed by: PHYSICIAN ASSISTANT

## 2018-02-05 PROCEDURE — 1159F MED LIST DOCD IN RCRD: CPT | Mod: S$GLB,,, | Performed by: PHYSICIAN ASSISTANT

## 2018-02-05 PROCEDURE — 86580 TB INTRADERMAL TEST: CPT | Mod: S$GLB,,, | Performed by: FAMILY MEDICINE

## 2018-02-05 PROCEDURE — 99499 UNLISTED E&M SERVICE: CPT | Mod: S$GLB,,, | Performed by: PHYSICIAN ASSISTANT

## 2018-02-05 PROCEDURE — 1125F AMNT PAIN NOTED PAIN PRSNT: CPT | Mod: S$GLB,,, | Performed by: PHYSICIAN ASSISTANT

## 2018-02-05 NOTE — PROGRESS NOTES
The patient presents today for physical exam prior to entering [facility] to live in am apartment.       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    Urinary incontinence     occasional leakage of urine     Past Surgical History:   Procedure Laterality Date    CATARACT EXTRACTION Bilateral     CHOLECYSTECTOMY      CIRCUMCISION      EYE SURGERY      GALLBLADDER SURGERY  2007    HEMORRHOID SURGERY      PROSTATE SURGERY      TONSILLECTOMY       Review of patient's allergies indicates:   Allergen Reactions    Demerol [meperidine] Other (See Comments)     arrest     Current Outpatient Prescriptions on File Prior to Visit   Medication Sig Dispense Refill    docusate sodium (COLACE) 100 MG capsule Take 1 capsule (100 mg total) by mouth 2 (two) times daily as needed for Constipation.  0    erythromycin with ethanol (EMGEL) 2 % gel Apply topically 2 (two) times daily. 60 g 2    FLUZONE HIGH-DOSE 2017-18, PF, 180 mcg/0.5 mL vaccine ADM 0.5ML IM UTD  0    gabapentin (NEURONTIN) 300 MG capsule TAKE 1 CAPSULE(300 MG) BY MOUTH THREE TIMES DAILY 270 capsule 2    levothyroxine (SYNTHROID) 100 MCG tablet Take 1 tablet (100 mcg total) by mouth once daily. 90 tablet 3    metoclopramide HCl (REGLAN) 10 MG tablet Take 10 mg by mouth 4 (four) times daily.      OMEPRAZOLE (PRILOSEC ORAL) Take by mouth once daily.       No current facility-administered medications on file prior to visit.      Social History     Social History    Marital status:      Spouse name: N/A    Number of children: N/A    Years of education: N/A     Occupational  History    Not on file.     Social History Main Topics    Smoking status: Former Smoker     Quit date: 12/5/1985    Smokeless tobacco: Never Used    Alcohol use 0.0 oz/week      Comment: gin and tonic regularly    Drug use: No    Sexual activity: Not on file     Other Topics Concern    Not on file     Social History Narrative    No narrative on file     Family History   Problem Relation Age of Onset    Cancer Mother      breast    Heart disease Mother     Heart disease Father     Stroke Father     Alzheimer's disease Brother     Arthritis Brother      back and neck         ROS:GENERAL: No fever, chills, fatigability or weight loss.  SKIN: No rashes, itching or changes in color or texture of skin.  HEAD: No headaches or recent head trauma.EYES: Visual acuity fine. No photophobia, ocular pain or diplopia.EARS: Denies ear pain, discharge or vertigo.NOSE: No loss of smell, no epistaxis or postnasal drip.MOUTH & THROAT: No hoarseness or change in voice. No excessive gum bleeding.NODES: Denies swollen glands.  CHEST: Denies FROST, cyanosis, wheezing, cough and sputum production.  CARDIOVASCULAR: Denies chest pain, PND, orthopnea or reduced exercise tolerance.  ABDOMEN: Appetite fine. No weight loss. Denies diarrhea, abdominal pain, hematemesis or blood in stool.  URINARY: No flank pain, dysuria or hematuria.  PERIPHERAL VASCULAR: No claudication or cyanosis.  MUSCULOSKELETAL: See above.  NEUROLOGIC: No history of seizures, paralysis, alteration of gait or coordination.    PE:   HEAD: Normocephalic, atraumatic.EYES: PERRL. EOMI.   EARS: TM's intact. Light reflex normal. No retraction or perforation.   NOSE: Mucosa pink. Airway clear.MOUTH & THROAT: No tonsillar enlargement. No pharyngeal erythema or exudate. No stridor.  NODES: No cervical, axillary or inguinal lymph node enlargement.  CHEST: Lungs clear to auscultation.  CARDIOVASCULAR: Normal S1, S2. No rubs, murmurs or gallops.  ABDOMEN: Bowel sounds normal.  Not distended. Soft. No tenderness or masses.  MUSCULOSKELETAL: No palpable abnormality, using a walker to ambulate  NEUROLOGIC: Cranial Nerves: II-XII grossly intact.  Motor: 5/5 strength major flexors/extensors.  DTR's: Knees, Ankles 2+ and equal bilaterally; downgoing toes.  Sensory: Intact to light touch distally.  Gait & Posture: Normal gait and fine motion. No cerebellar signs.     Impression:Routine health check  Plan:TB skin test and chest X ray    Hypothyroidism, unspecified type  -     TSH; Future; Expected date: 02/05/2018    Essential hypertension  -     Comprehensive metabolic panel; Future; Expected date: 02/05/2018    Physical exam  -     POCT TB Skin Test  -     X-Ray Chest PA And Lateral; Future; Expected date: 02/05/2018

## 2018-02-06 ENCOUNTER — TELEPHONE (OUTPATIENT)
Dept: FAMILY MEDICINE | Facility: CLINIC | Age: 83
End: 2018-02-06

## 2018-02-06 NOTE — TELEPHONE ENCOUNTER
----- Message from RT Puma sent at 2/6/2018  2:23 PM CST -----  Contact: pt    pt , requesting a call back soon to confirm if he will need his blood drawn for any reason at tomorrows appt, thanks.

## 2018-02-06 NOTE — TELEPHONE ENCOUNTER
Per Akila Cote, patient to fast for labs tomorrow to rule out diabetes (via CMP). Patient's daughter notified. Labs and CXR scheduled 2/7/18

## 2018-02-07 ENCOUNTER — HOSPITAL ENCOUNTER (OUTPATIENT)
Dept: RADIOLOGY | Facility: HOSPITAL | Age: 83
Discharge: HOME | End: 2018-02-07
Attending: PHYSICIAN ASSISTANT
Payer: MEDICARE

## 2018-02-07 ENCOUNTER — TELEPHONE (OUTPATIENT)
Dept: FAMILY MEDICINE | Facility: CLINIC | Age: 83
End: 2018-02-07

## 2018-02-07 DIAGNOSIS — Z00.00 PHYSICAL EXAM: ICD-10-CM

## 2018-02-07 LAB
TB INDURATION - 48 HR READ: NORMAL MM
TB INDURATION - 72 HR READ: NORMAL MM
TB SKIN TEST - 48 HR READ: NORMAL
TB SKIN TEST - 72 HR READ: NORMAL

## 2018-02-07 PROCEDURE — 71046 X-RAY EXAM CHEST 2 VIEWS: CPT | Mod: TC,FY,PO

## 2018-02-07 PROCEDURE — 71046 X-RAY EXAM CHEST 2 VIEWS: CPT | Mod: 26,,, | Performed by: RADIOLOGY

## 2018-02-07 NOTE — TELEPHONE ENCOUNTER
----- Message from Macrina Conroy sent at 2/6/2018  4:26 PM CST -----  Contact: patient  Patient calling to speak with the Nurse about coming in earlier tomorrow morning for his Nurse Visit and chest Xray. He needs to be finished with everything before 10:30 am. Please advise  Call back   Thanks!

## 2018-02-08 ENCOUNTER — TELEPHONE (OUTPATIENT)
Dept: FAMILY MEDICINE | Facility: CLINIC | Age: 83
End: 2018-02-08

## 2018-02-08 NOTE — TELEPHONE ENCOUNTER
----- Message from Latoya Rincon sent at 2/8/2018  2:15 PM CST -----  Contact: self  Patient requesting call back regarding medical forms that he is supposed to . Please call back [phone] (home)

## 2018-02-12 ENCOUNTER — TELEPHONE (OUTPATIENT)
Dept: FAMILY MEDICINE | Facility: CLINIC | Age: 83
End: 2018-02-12

## 2018-02-12 NOTE — TELEPHONE ENCOUNTER
----- Message from Nelia Blackmon sent at 2/12/2018  1:31 PM CST -----  Contact: self  Patient needs to know if the medical reports where sent to him. If not he would like to  on Wednesday 02/14/18. Patient has left several messages and would like a call back at [phone].

## 2018-02-15 ENCOUNTER — TELEPHONE (OUTPATIENT)
Dept: FAMILY MEDICINE | Facility: CLINIC | Age: 83
End: 2018-02-15

## 2018-02-15 NOTE — TELEPHONE ENCOUNTER
----- Message from Abraham Patton sent at 2/14/2018  8:42 AM CST -----  Contact: patient  Patient called requesting appointment for 10:00am due to transportation on 02/16/18.i offered other appointment time.patient stated that is the only time he can come. Call back at [phone].please leave msg if he can come in for that time. Thanks,

## 2018-02-15 NOTE — TELEPHONE ENCOUNTER
----- Message from Shaye Aparicio sent at 2/12/2018  8:43 AM CST -----  Contact: Patient  Toni, patient [phone] calling because he wants to speak with office regarding paperwork that was supposed to be mailed to [facility]. Please advise. Thanks.

## 2018-02-15 NOTE — TELEPHONE ENCOUNTER
Spoke to pt. Pt requesting us to mail results from TB test and xray to [facility]. Results mailed. Pt verbalized understanding.

## 2018-02-16 ENCOUNTER — OFFICE VISIT (OUTPATIENT)
Dept: FAMILY MEDICINE | Facility: CLINIC | Age: 83
End: 2018-02-16
Payer: MEDICARE

## 2018-02-16 VITALS
HEART RATE: 68 BPM | WEIGHT: 192 LBS | HEIGHT: 73 IN | BODY MASS INDEX: 25.45 KG/M2 | SYSTOLIC BLOOD PRESSURE: 120 MMHG | DIASTOLIC BLOOD PRESSURE: 62 MMHG

## 2018-02-16 DIAGNOSIS — R35.1 NOCTURIA: ICD-10-CM

## 2018-02-16 DIAGNOSIS — M51.36 DDD (DEGENERATIVE DISC DISEASE), LUMBAR: ICD-10-CM

## 2018-02-16 DIAGNOSIS — F41.1 GAD (GENERALIZED ANXIETY DISORDER): Primary | ICD-10-CM

## 2018-02-16 LAB
BILIRUB UR QL STRIP: NEGATIVE
CLARITY UR: CLEAR
COLOR UR: YELLOW
GLUCOSE UR QL STRIP: NEGATIVE
HGB UR QL STRIP: NEGATIVE
KETONES UR QL STRIP: ABNORMAL
LEUKOCYTE ESTERASE UR QL STRIP: NEGATIVE
NITRITE UR QL STRIP: NEGATIVE
PH UR STRIP: 5 [PH] (ref 5–8)
PROT UR QL STRIP: NEGATIVE
SP GR UR STRIP: 1.02 (ref 1–1.03)
URN SPEC COLLECT METH UR: ABNORMAL

## 2018-02-16 PROCEDURE — 81003 URINALYSIS AUTO W/O SCOPE: CPT | Mod: PO

## 2018-02-16 PROCEDURE — 99214 OFFICE O/P EST MOD 30 MIN: CPT | Mod: S$GLB,,, | Performed by: PHYSICIAN ASSISTANT

## 2018-02-16 PROCEDURE — 99499 UNLISTED E&M SERVICE: CPT | Mod: S$GLB,,, | Performed by: PHYSICIAN ASSISTANT

## 2018-02-16 PROCEDURE — 99999 PR PBB SHADOW E&M-EST. PATIENT-LVL III: CPT | Mod: PBBFAC,,, | Performed by: PHYSICIAN ASSISTANT

## 2018-02-16 PROCEDURE — 1125F AMNT PAIN NOTED PAIN PRSNT: CPT | Mod: S$GLB,,, | Performed by: PHYSICIAN ASSISTANT

## 2018-02-16 PROCEDURE — 1159F MED LIST DOCD IN RCRD: CPT | Mod: S$GLB,,, | Performed by: PHYSICIAN ASSISTANT

## 2018-02-16 PROCEDURE — 3008F BODY MASS INDEX DOCD: CPT | Mod: S$GLB,,, | Performed by: PHYSICIAN ASSISTANT

## 2018-02-16 RX ORDER — CITALOPRAM 20 MG/1
20 TABLET, FILM COATED ORAL DAILY
Qty: 30 TABLET | Refills: 11 | Status: ON HOLD | OUTPATIENT
Start: 2018-02-16 | End: 2018-02-22 | Stop reason: HOSPADM

## 2018-02-16 RX ORDER — GABAPENTIN 300 MG/1
CAPSULE ORAL
Qty: 180 CAPSULE | Refills: 0 | Status: SHIPPED | OUTPATIENT
Start: 2018-02-16 | End: 2018-03-22 | Stop reason: SDUPTHER

## 2018-02-16 NOTE — PROGRESS NOTES
Subjective:       Patient ID: Toni Uriarte is a 88 y.o. male    Chief Complaint: Peripheral Neuropathy (Neuropathy on feet and leg. Medication not seemed to work. Has been switching between tylenol and ibuprofen.Frequent urination at night up tp 4-5 times.No pain.)    HPI  Mr Uriarte presents today CO pain in the bottom of his feet that has gotten worse.  He is taking his Neurontin 300 mg TID without side effects.  Also he is CO nocturia.  He has a history of prostate surgery many years ago.  He denies any difficulty urinating.  He admits that he usually drinks gin and tonic every evening.  Also he hs been having difficulty sleeping since his wife  in April last year.  After his wife  he lost a lot of weight and he has not been enjoying activities that he used to enjoy.      Review of Systems   Constitutional: Negative for chills and fever.   Gastrointestinal: Negative for diarrhea, nausea and vomiting.   Neurological: Negative for headaches.   Psychiatric/Behavioral: Positive for sleep disturbance. Negative for agitation, confusion and decreased concentration. The patient is nervous/anxious.         Objective:   Physical Exam   Constitutional: He is oriented to person, place, and time. He appears well-developed and well-nourished. No distress.   HENT:   Head: Normocephalic and atraumatic.   Right Ear: External ear normal.   Left Ear: External ear normal.   Nose: Nose normal.   Mouth/Throat: Oropharynx is clear and moist.   Eyes: Conjunctivae and EOM are normal. Pupils are equal, round, and reactive to light.   Neck: Normal range of motion. Neck supple. No JVD present.   Cardiovascular: Normal rate, regular rhythm and normal heart sounds.  Exam reveals no gallop and no friction rub.    No murmur heard.  Pulmonary/Chest: Effort normal and breath sounds normal. No respiratory distress. He has no wheezes. He has no rales.   Abdominal: Soft. Bowel sounds are normal. He exhibits no distension and no mass.  There is no tenderness. There is no guarding.   Musculoskeletal: Normal range of motion. He exhibits no edema.   Neurological: He is alert and oriented to person, place, and time.   Skin: Skin is warm and dry.   Psychiatric: He has a normal mood and affect. His behavior is normal. Judgment and thought content normal.        Assessment:       1. TYLER (generalized anxiety disorder)  citalopram (CELEXA) 20 MG tablet   2. DDD (degenerative disc disease), lumbar  gabapentin (NEURONTIN) 300 MG capsule   3. Nocturia  URINALYSIS        Plan:       TYLER (generalized anxiety disorder)  -     START citalopram (CELEXA) 20 MG tablet; Take 1 tablet (20 mg total) by mouth once daily.  Dispense: 30 tablet; Refill: 11  RTC in 1 month    DDD (degenerative disc disease), lumbar  -     INCREASED gabapentin (NEURONTIN) 300 MG capsule; TAKE 2 CAPSULE(300 MG) BY MOUTH THREE TIMES DAILY  Dispense: 180 capsule; Refill: 0    Nocturia  -     URINALYSIS  - drinking less in the evening, no alcohol in the evening

## 2018-02-19 PROBLEM — J10.1 INFLUENZA A: Status: ACTIVE | Noted: 2018-02-19

## 2018-02-19 PROBLEM — R29.898 MUSCULAR DECONDITIONING: Status: ACTIVE | Noted: 2018-02-19

## 2018-02-19 PROBLEM — F32.A DEPRESSION: Status: ACTIVE | Noted: 2018-02-19

## 2018-02-19 PROBLEM — G62.9 PERIPHERAL NEUROPATHY: Status: ACTIVE | Noted: 2018-02-19

## 2018-02-20 PROBLEM — G57.11 MERALGIA PARESTHETICA, RIGHT: Status: ACTIVE | Noted: 2018-02-20

## 2018-02-21 ENCOUNTER — TELEPHONE (OUTPATIENT)
Dept: FAMILY MEDICINE | Facility: CLINIC | Age: 83
End: 2018-02-21

## 2018-02-21 NOTE — TELEPHONE ENCOUNTER
I called the patient's daughter, no answer.      The chest X ray does not show any abnormality.  Please let me know of any further question.      Akila AGUILAR

## 2018-02-21 NOTE — TELEPHONE ENCOUNTER
----- Message from Nelia Blackmon sent at 2/20/2018  4:33 PM CST -----  Contact: daughterFrancine  Patient is waiting on the results of the TB test and xray so that [facility] can complete his application. Please call daughterFrancine at [phone]. Thanks!

## 2018-02-22 NOTE — TELEPHONE ENCOUNTER
Spoke to pt daughter. Pt daughter states she already had this taken care of and denies any further needs.

## 2018-03-01 ENCOUNTER — TELEPHONE (OUTPATIENT)
Dept: FAMILY MEDICINE | Facility: CLINIC | Age: 83
End: 2018-03-01

## 2018-03-12 ENCOUNTER — OFFICE VISIT (OUTPATIENT)
Dept: UROLOGY | Facility: CLINIC | Age: 83
End: 2018-03-12
Payer: MEDICARE

## 2018-03-12 VITALS
SYSTOLIC BLOOD PRESSURE: 130 MMHG | WEIGHT: 187.38 LBS | BODY MASS INDEX: 24.05 KG/M2 | HEIGHT: 74 IN | HEART RATE: 76 BPM | DIASTOLIC BLOOD PRESSURE: 78 MMHG

## 2018-03-12 DIAGNOSIS — N13.8 ENLARGED PROSTATE WITH URINARY OBSTRUCTION: ICD-10-CM

## 2018-03-12 DIAGNOSIS — R39.15 URINARY URGENCY: ICD-10-CM

## 2018-03-12 DIAGNOSIS — R35.0 URINARY FREQUENCY: Primary | ICD-10-CM

## 2018-03-12 DIAGNOSIS — R33.9 URINARY RETENTION: ICD-10-CM

## 2018-03-12 DIAGNOSIS — R35.1 NOCTURIA MORE THAN TWICE PER NIGHT: ICD-10-CM

## 2018-03-12 DIAGNOSIS — N40.1 ENLARGED PROSTATE WITH URINARY OBSTRUCTION: ICD-10-CM

## 2018-03-12 LAB
BILIRUB SERPL-MCNC: ABNORMAL MG/DL
BLOOD URINE, POC: ABNORMAL
COLOR, POC UA: YELLOW
GLUCOSE UR QL STRIP: ABNORMAL
KETONES UR QL STRIP: ABNORMAL
LEUKOCYTE ESTERASE URINE, POC: ABNORMAL
NITRITE, POC UA: ABNORMAL
PH, POC UA: 5.5
PROTEIN, POC: ABNORMAL
SPECIFIC GRAVITY, POC UA: 1.01
UROBILINOGEN, POC UA: ABNORMAL

## 2018-03-12 PROCEDURE — 99204 OFFICE O/P NEW MOD 45 MIN: CPT | Mod: 25,S$GLB,, | Performed by: UROLOGY

## 2018-03-12 PROCEDURE — 87077 CULTURE AEROBIC IDENTIFY: CPT

## 2018-03-12 PROCEDURE — 87186 SC STD MICRODIL/AGAR DIL: CPT

## 2018-03-12 PROCEDURE — 99999 PR PBB SHADOW E&M-EST. PATIENT-LVL III: CPT | Mod: PBBFAC,,, | Performed by: UROLOGY

## 2018-03-12 PROCEDURE — 81002 URINALYSIS NONAUTO W/O SCOPE: CPT | Mod: S$GLB,,, | Performed by: UROLOGY

## 2018-03-12 PROCEDURE — 87088 URINE BACTERIA CULTURE: CPT

## 2018-03-12 PROCEDURE — 87086 URINE CULTURE/COLONY COUNT: CPT

## 2018-03-12 RX ORDER — TAMSULOSIN HYDROCHLORIDE 0.4 MG/1
0.4 CAPSULE ORAL DAILY
COMMUNITY
End: 2018-03-24 | Stop reason: SDUPTHER

## 2018-03-12 RX ORDER — MELATONIN 5 MG
1 CAPSULE ORAL NIGHTLY
COMMUNITY

## 2018-03-12 NOTE — PROGRESS NOTES
Subjective:       Patient ID: Toni Uriarte is a 88 y.o. male.    Chief Complaint: Urinary Frequency    HPI     88 year old with urinary frequency and nocturia x6.  He says this has been an ongoing problems for years but is gradually getting worse.  He had TURP 20-25 years ago and after surgery he had improvement in his symptoms.  He began Flomax 1 weeks ago and feels there has already been some improvement.  He has not taken any BPH medicine since his surgery 25 years ago until last week.  He denies hematuria and dysuria.  He has no incontinence.  He was ill with flu recently and due to weakness was unable to get out of bed and wore diaper during that period.  He has no family history of prostate cancer.  AUA symptom score is 25.  Urine dipstick shows positive for trace blood and positive for 2+leukocytes.  Micro exam: 5-10 WBC's per HPF and 2+ bacteria.  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    Urinary incontinence     occasional leakage of urine     Past Surgical History:   Procedure Laterality Date    CATARACT EXTRACTION Bilateral     CHOLECYSTECTOMY      CIRCUMCISION      EYE SURGERY      GALLBLADDER SURGERY  2007    HEMORRHOID SURGERY      PROSTATE SURGERY      TONSILLECTOMY         Current Outpatient Prescriptions:     docusate sodium (COLACE) 100 MG capsule, Take 1 capsule (100 mg total) by mouth 2 (two) times daily as needed  for Constipation., Disp: , Rfl: 0    gabapentin (NEURONTIN) 300 MG capsule, TAKE 2 CAPSULE(300 MG) BY MOUTH THREE TIMES DAILY (Patient taking differently: 600 mg 3 (three) times daily. TAKE 2 CAPSULE(300 MG) BY MOUTH THREE TIMES DAILY), Disp: 180 capsule, Rfl: 0    levothyroxine (SYNTHROID) 100 MCG tablet, Take 1 tablet (100 mcg total) by mouth once daily., Disp: 90 tablet, Rfl: 3    melatonin 10 mg Cap, Take by mouth., Disp: , Rfl:     menthol (BIOFREEZE, MENTHOL,) 4 % Gel, Apply topically., Disp: , Rfl:     OMEPRAZOLE (PRILOSEC ORAL), Take 20 mg by mouth once daily. , Disp: , Rfl:     SAW PALMETTO FRT/PHYTOSTEROL 2 (PROSTATE SR ORAL), Take 30 mg by mouth., Disp: , Rfl:     tamsulosin (FLOMAX) 0.4 mg Cp24, Take 0.4 mg by mouth once daily., Disp: , Rfl:     erythromycin with ethanol (EMGEL) 2 % gel, Apply topically 2 (two) times daily., Disp: 60 g, Rfl: 2    metoclopramide HCl (REGLAN) 10 MG tablet, Take 10 mg by mouth 4 (four) times daily., Disp: , Rfl:     polyethylene glycol (GLYCOLAX) 17 gram PwPk, Take 17 g by mouth once daily., Disp: 30 each, Rfl: 0    Review of Systems   Constitutional: Negative for fever.   Eyes: Negative for visual disturbance.   Respiratory: Negative for shortness of breath.    Cardiovascular: Negative for chest pain.   Gastrointestinal: Negative for nausea.   Genitourinary: Positive for frequency and urgency. Negative for dysuria and hematuria.   Musculoskeletal: Positive for gait problem.   Skin: Negative for rash.   Neurological: Negative for seizures.   Psychiatric/Behavioral: Negative for confusion.       Objective:      Physical Exam   Constitutional: He is oriented to person, place, and time. He appears well-developed and well-nourished.   HENT:   Head: Normocephalic and atraumatic.   Eyes: Conjunctivae are normal.   Cardiovascular: Normal rate.    Pulmonary/Chest: Effort normal.   Genitourinary: Rectal exam shows no mass and anal tone normal. Prostate is enlarged (40g,  s/s/a). Prostate is not tender.   Musculoskeletal: Normal range of motion. He exhibits no edema.   Neurological: He is alert and oriented to person, place, and time.   Skin: Skin is warm and dry. No rash noted.   Psychiatric: He has a normal mood and affect.   Vitals reviewed.      Assessment:       1. Urinary frequency    2. Enlarged prostate with urinary obstruction    3. Urinary urgency    4. Nocturia more than twice per night    5. Urinary retention        Plan:       Urinary frequency  -     POCT URINE DIPSTICK WITHOUT MICROSCOPE  -     Urine culture; Future; Expected date: 03/12/2018    Enlarged prostate with urinary obstruction    Urinary urgency    Nocturia more than twice per night    Urinary retention      f/u urine culture.  I recommend continue Flomax for now.  I suspect continued improvement.  RTC 3 months

## 2018-03-14 ENCOUNTER — PATIENT MESSAGE (OUTPATIENT)
Dept: FAMILY MEDICINE | Facility: CLINIC | Age: 83
End: 2018-03-14

## 2018-03-14 ENCOUNTER — TELEPHONE (OUTPATIENT)
Dept: FAMILY MEDICINE | Facility: CLINIC | Age: 83
End: 2018-03-14

## 2018-03-14 NOTE — TELEPHONE ENCOUNTER
----- Message from Keya Ray sent at 3/13/2018  3:46 PM CDT -----  Patients daughter Margie, is returning Iveth's call regarding a appt, please call [phone]

## 2018-03-14 NOTE — TELEPHONE ENCOUNTER
Attempted to contact patient's daughter. Left message to call clinic back. Appointment on 4/4 needs to be rescheduled.

## 2018-03-15 LAB — BACTERIA UR CULT: NORMAL

## 2018-03-15 RX ORDER — CIPROFLOXACIN 500 MG/1
500 TABLET ORAL 2 TIMES DAILY
Qty: 20 TABLET | Refills: 0 | Status: SHIPPED | OUTPATIENT
Start: 2018-03-15 | End: 2018-03-25

## 2018-03-22 DIAGNOSIS — M51.36 DDD (DEGENERATIVE DISC DISEASE), LUMBAR: ICD-10-CM

## 2018-03-22 RX ORDER — GABAPENTIN 300 MG/1
CAPSULE ORAL
Qty: 180 CAPSULE | Refills: 3 | Status: ON HOLD | OUTPATIENT
Start: 2018-03-22 | End: 2018-05-02 | Stop reason: HOSPADM

## 2018-03-23 ENCOUNTER — PATIENT MESSAGE (OUTPATIENT)
Dept: FAMILY MEDICINE | Facility: CLINIC | Age: 83
End: 2018-03-23

## 2018-03-26 DIAGNOSIS — R35.0 URINARY FREQUENCY: ICD-10-CM

## 2018-03-26 DIAGNOSIS — N40.0 BENIGN PROSTATIC HYPERPLASIA, UNSPECIFIED WHETHER LOWER URINARY TRACT SYMPTOMS PRESENT: Primary | ICD-10-CM

## 2018-03-26 RX ORDER — TAMSULOSIN HYDROCHLORIDE 0.4 MG/1
1 CAPSULE ORAL DAILY
Qty: 90 CAPSULE | Refills: 3 | Status: SHIPPED | OUTPATIENT
Start: 2018-03-26 | End: 2018-04-04 | Stop reason: SDUPTHER

## 2018-03-26 RX ORDER — TAMSULOSIN HYDROCHLORIDE 0.4 MG/1
CAPSULE ORAL
Qty: 90 CAPSULE | Refills: 0 | Status: SHIPPED | OUTPATIENT
Start: 2018-03-26 | End: 2018-03-26 | Stop reason: SDUPTHER

## 2018-04-04 ENCOUNTER — OFFICE VISIT (OUTPATIENT)
Dept: FAMILY MEDICINE | Facility: CLINIC | Age: 83
End: 2018-04-04
Payer: MEDICARE

## 2018-04-04 VITALS
HEART RATE: 68 BPM | HEIGHT: 74 IN | DIASTOLIC BLOOD PRESSURE: 60 MMHG | SYSTOLIC BLOOD PRESSURE: 130 MMHG | WEIGHT: 202.38 LBS | BODY MASS INDEX: 25.97 KG/M2

## 2018-04-04 DIAGNOSIS — K59.00 CONSTIPATION, UNSPECIFIED CONSTIPATION TYPE: ICD-10-CM

## 2018-04-04 DIAGNOSIS — N40.0 BENIGN PROSTATIC HYPERPLASIA, UNSPECIFIED WHETHER LOWER URINARY TRACT SYMPTOMS PRESENT: ICD-10-CM

## 2018-04-04 DIAGNOSIS — R35.0 URINARY FREQUENCY: ICD-10-CM

## 2018-04-04 DIAGNOSIS — R45.89 NON-SUICIDAL DEPRESSED MOOD: Primary | ICD-10-CM

## 2018-04-04 DIAGNOSIS — G60.9 IDIOPATHIC PERIPHERAL NEUROPATHY: ICD-10-CM

## 2018-04-04 LAB
BILIRUB UR QL STRIP: NEGATIVE
CLARITY UR: CLEAR
COLOR UR: YELLOW
GLUCOSE UR QL STRIP: NEGATIVE
HGB UR QL STRIP: NEGATIVE
KETONES UR QL STRIP: ABNORMAL
LEUKOCYTE ESTERASE UR QL STRIP: NEGATIVE
NITRITE UR QL STRIP: NEGATIVE
PH UR STRIP: 6 [PH] (ref 5–8)
PROT UR QL STRIP: NEGATIVE
SP GR UR STRIP: 1.02 (ref 1–1.03)
URN SPEC COLLECT METH UR: ABNORMAL

## 2018-04-04 PROCEDURE — 99499 UNLISTED E&M SERVICE: CPT | Mod: S$GLB,,, | Performed by: PHYSICIAN ASSISTANT

## 2018-04-04 PROCEDURE — 81003 URINALYSIS AUTO W/O SCOPE: CPT | Mod: PO

## 2018-04-04 PROCEDURE — 87086 URINE CULTURE/COLONY COUNT: CPT

## 2018-04-04 PROCEDURE — 99999 PR PBB SHADOW E&M-EST. PATIENT-LVL IV: CPT | Mod: PBBFAC,,, | Performed by: PHYSICIAN ASSISTANT

## 2018-04-04 PROCEDURE — 99215 OFFICE O/P EST HI 40 MIN: CPT | Mod: S$GLB,,, | Performed by: PHYSICIAN ASSISTANT

## 2018-04-04 RX ORDER — TAMSULOSIN HYDROCHLORIDE 0.4 MG/1
0.8 CAPSULE ORAL DAILY
Qty: 90 CAPSULE | Refills: 3 | Status: SHIPPED | OUTPATIENT
Start: 2018-04-04 | End: 2018-05-15 | Stop reason: SDUPTHER

## 2018-04-04 NOTE — PROGRESS NOTES
"Subjective:       Patient ID: Toni Uriarte is a 88 y.o. male    Chief Complaint: ER follow up (went to [facility] for constipation and swollen leg.Patient states leg still swollen.Feet and legs hurting a lot,going to bathroom 7-8 times at night,not sleeping weel,fatigue)    HPI  Mr Uriarte presents today CO frequent urination that is causing him to get up at night 7+ times each night which makes him tired throughout the day.   He has a recent UTI and was treated by his urologist with cipro.   Also he CO worsening peripheral neuropathy.  At his last visit with me we increased his Neurontin to 600 mg TID.  He reports that this has not helped his pain and he has been taking Tylenol and Ibuprofen over the counter with temporary relief.  Also he admits that his mood is down and he has been feeling like he wishes he could join his wife who  about one year ago. He denies any suicidal thoughts.  He was recently seen in the ED for constipation that resolved with Golytely and dulcolax.     Mr Uriarte is requesting to see "his doctor" and threatening to find a new doctor.  He has a list of issues and he is demanding that they all be solved today.  (constipation, urinary frequency, fatigue, foot pain).      Review of Systems   Constitutional: Negative for chills and fever.   HENT: Negative for congestion and sore throat.    Eyes: Negative for visual disturbance.   Respiratory: Negative for cough and shortness of breath.    Cardiovascular: Negative for chest pain.   Gastrointestinal: Negative for diarrhea, nausea and vomiting.   Genitourinary: Positive for frequency (waking up 7+ times during the night). Negative for dysuria.   Musculoskeletal: Negative for arthralgias.   Skin: Negative for rash.   Neurological: Negative for headaches.   Psychiatric/Behavioral: Negative for sleep disturbance.        Objective:   Physical Exam   Constitutional: He is oriented to person, place, and time. He appears " well-developed and well-nourished. No distress.   HENT:   Head: Normocephalic and atraumatic.   Right Ear: External ear normal.   Left Ear: External ear normal.   Nose: Nose normal.   Mouth/Throat: Oropharynx is clear and moist.   Eyes: Conjunctivae are normal. Pupils are equal, round, and reactive to light.   Neck: Normal range of motion. Neck supple.   Cardiovascular: Normal rate, regular rhythm and normal heart sounds.    No murmur heard.  Pulmonary/Chest: Effort normal and breath sounds normal. No respiratory distress. He has no wheezes.   Abdominal: Soft. Bowel sounds are normal. There is no tenderness.   Genitourinary: Rectum normal and prostate normal.   Genitourinary Comments: No prostate mass or tenderness    Musculoskeletal: Normal range of motion.   Neurological: He is alert and oriented to person, place, and time.   Skin: Skin is warm and dry. No rash noted.   Psychiatric: He has a normal mood and affect. His behavior is normal.        Assessment:       1. Non-suicidal depressed mood     2. Benign prostatic hyperplasia, unspecified whether lower urinary tract symptoms present  tamsulosin (FLOMAX) 0.4 mg Cp24   3. Urinary frequency  tamsulosin (FLOMAX) 0.4 mg Cp24    Urinalysis    Urine culture   4. Constipation, unspecified constipation type     5. Idiopathic peripheral neuropathy          Plan:       Non-suicidal depressed mood  - consider referral for conciling if mood does not imporve after addressing current complaints of neuropathy and urinary frequency  - he does not want to be treated with medications     Benign prostatic hyperplasia, unspecified whether lower urinary tract symptoms present  -     INCREASED tamsulosin (FLOMAX) 0.4 mg Cp24; Take 2 capsules (0.8 mg total) by mouth once daily.  Dispense: 90 capsule; Refill: 3    Urinary frequency  -    INCREASED tamsulosin (FLOMAX) 0.4 mg Cp24; Take 2 capsules (0.8 mg total) by mouth once daily.  Dispense: 90 capsule; Refill: 3  -     Urinalysis  -      Urine culture  - moved up appointment with urology to 2 weeks from now    Constipation, unspecified constipation type  - resolved    Idiopathic peripheral neuropathy  - continue Neurontin  - continue Tylenol (less than 2,000 mg daily)  - continue Ibuprofen PRN (he does not have a history of GI ulcer or bleeding)  - discussed pain medication risks and reasons to not use long term

## 2018-04-06 LAB — BACTERIA UR CULT: NO GROWTH

## 2018-04-09 ENCOUNTER — TELEPHONE (OUTPATIENT)
Dept: UROLOGY | Facility: CLINIC | Age: 83
End: 2018-04-09

## 2018-04-09 DIAGNOSIS — R35.1 NOCTURIA MORE THAN TWICE PER NIGHT: Primary | ICD-10-CM

## 2018-04-09 RX ORDER — OXYBUTYNIN CHLORIDE 5 MG/1
5 TABLET ORAL NIGHTLY
Qty: 30 TABLET | Refills: 11 | Status: SHIPPED | OUTPATIENT
Start: 2018-04-09 | End: 2019-03-01 | Stop reason: SDUPTHER

## 2018-04-09 RX ORDER — POLYETHYLENE GLYCOL 3350 17 G/17G
POWDER, FOR SOLUTION ORAL
COMMUNITY
Start: 2018-02-22 | End: 2018-04-23

## 2018-04-09 NOTE — TELEPHONE ENCOUNTER
Patient is having significant nocturia.  Flomax was increased to 0.8 mg by NP 6 days ago and it is not helping, he is up every hour to urinate.  Questions if we can add another medication to regimen.

## 2018-04-10 NOTE — TELEPHONE ENCOUNTER
Spoke with daughter, she will start patient on Oxybutynin today, start a voiding diary and follow up is scheduled on 4/19/18.

## 2018-04-17 ENCOUNTER — OFFICE VISIT (OUTPATIENT)
Dept: PODIATRY | Facility: CLINIC | Age: 83
End: 2018-04-17
Payer: MEDICARE

## 2018-04-17 VITALS — WEIGHT: 202.38 LBS | HEIGHT: 74 IN | BODY MASS INDEX: 25.97 KG/M2

## 2018-04-17 DIAGNOSIS — R60.0 PERIPHERAL EDEMA: ICD-10-CM

## 2018-04-17 DIAGNOSIS — G60.9 IDIOPATHIC PERIPHERAL NEUROPATHY: Primary | ICD-10-CM

## 2018-04-17 DIAGNOSIS — B35.1 ONYCHOMYCOSIS DUE TO DERMATOPHYTE: ICD-10-CM

## 2018-04-17 DIAGNOSIS — I87.2 VENOUS INSUFFICIENCY: ICD-10-CM

## 2018-04-17 PROCEDURE — 11721 DEBRIDE NAIL 6 OR MORE: CPT | Mod: Q9,S$GLB,, | Performed by: PODIATRIST

## 2018-04-17 PROCEDURE — 99213 OFFICE O/P EST LOW 20 MIN: CPT | Mod: 25,S$GLB,, | Performed by: PODIATRIST

## 2018-04-17 PROCEDURE — 99999 PR PBB SHADOW E&M-EST. PATIENT-LVL III: CPT | Mod: PBBFAC,,, | Performed by: PODIATRIST

## 2018-04-17 PROCEDURE — 99499 UNLISTED E&M SERVICE: CPT | Mod: S$GLB,,, | Performed by: PODIATRIST

## 2018-04-17 NOTE — PROGRESS NOTES
Subjective:      Patient ID: Toni Uriarte is a 88 y.o. male.    Chief Complaint: Follow-up (4 month f/u recheck nails, PCP--2/16/18)      Toni Chavez is a 88 y.o. male who presents to the clinic for evaluation and treatment of high risk feet. Toni Chavez has a past medical history of Arthritis; Basal cell carcinoma; BPH (benign prostatic hypertrophy); Depression (2/19/2018); Disorder of kidney and ureter; GERD (gastroesophageal reflux disease); Hyperlipidemia; Hypertension; Hypothyroid; VIKTORIA (obstructive sleep apnea); Snoring; Squamous cell carcinoma; and Urinary incontinence. The patient's chief complaint is long, thick toenails.  Denies being painful with wearing closed toe shoes.  Has not attempted to self treat.  Denies any additional pedal complaints.     PCP: Miguel Contreras MD    Date Last Seen by PCP: 2/16/18      Hemoglobin A1C   Date Value Ref Range Status   04/25/2007 5.0 4.5 - 6.2 % Final       Review of Systems   Constitution: Negative for chills and fever.   Cardiovascular: Negative for claudication and leg swelling.   Skin: Positive for color change, dry skin and nail changes.   Musculoskeletal: Positive for arthritis and joint pain.   Neurological: Positive for numbness. Negative for paresthesias.   Psychiatric/Behavioral: Negative for altered mental status.           Objective:      Physical Exam   Constitutional: He is oriented to person, place, and time. He appears well-developed and well-nourished. No distress.   Cardiovascular:   Pulses:       Dorsalis pedis pulses are 2+ on the right side, and 2+ on the left side.        Posterior tibial pulses are 1+ on the right side, and 1+ on the left side.   CFT <3 seconds bilateral.  Pedal hair growth absent bilateral.  Varicosities noted bilateral.  1+ pitting edema noted bilateral. Toes are cool to touch bilateral.     Musculoskeletal: He exhibits no edema or tenderness.   Muscle strength 5/5 in all muscle groups bilateral.  No  tenderness nor crepitation with ROM of foot/ankle joints bilateral. No pain with palpation of bilateral foot and ankle.  Bilateral pes cavus foot type.  Bilateral semi-rigid contracture of toes 2-5.     Neurological: He is alert and oriented to person, place, and time. He has normal strength. A sensory deficit is present.   Protective sensation per [place] monofilament absent bilateral.    Vibratory sensation absent bilateral.    Light touch intact bilateral.   Skin: Skin is warm, dry and intact. Capillary refill takes less than 2 seconds. No abrasion, no bruising, no burn, no ecchymosis, no laceration, no lesion, no petechiae and no rash noted. He is not diaphoretic. No erythema. No pallor.   Peal skin appears edematous bilateral.  Toenails x 10 appear thickened by 2 mm's, elongated by 2 mm's, and discolored with subungual debris.  Resolution of prior interdigital maceration.               Assessment:       Encounter Diagnoses   Name Primary?    Idiopathic peripheral neuropathy Yes    Onychomycosis due to dermatophyte     Venous insufficiency     Peripheral edema          Plan:       Toni Chavez was seen today for follow-up.    Diagnoses and all orders for this visit:    Idiopathic peripheral neuropathy    Onychomycosis due to dermatophyte    Venous insufficiency    Peripheral edema      I counseled the patient on his conditions, their implications and medical management.    Patient to continue wearing compression sleeves and elevating the limbs to accommodate for LE edema.    With the patient's verbal consent, a sterile nail nipper was used to trim toenails x 10 down to their soft tissue attachments without incident. Patient tolerated this quite well.    Instructed to inspect feet daily for signs of localized infection or skin breakdown.    RTC in 4 months for routine follow up.    Celso Quinteros DPM

## 2018-04-19 ENCOUNTER — OFFICE VISIT (OUTPATIENT)
Dept: UROLOGY | Facility: CLINIC | Age: 83
End: 2018-04-19
Payer: MEDICARE

## 2018-04-19 VITALS
SYSTOLIC BLOOD PRESSURE: 138 MMHG | BODY MASS INDEX: 26 KG/M2 | HEART RATE: 43 BPM | DIASTOLIC BLOOD PRESSURE: 57 MMHG | WEIGHT: 202.63 LBS | HEIGHT: 74 IN

## 2018-04-19 DIAGNOSIS — N40.1 ENLARGED PROSTATE WITH URINARY OBSTRUCTION: ICD-10-CM

## 2018-04-19 DIAGNOSIS — N13.8 ENLARGED PROSTATE WITH URINARY OBSTRUCTION: ICD-10-CM

## 2018-04-19 DIAGNOSIS — R35.1 NOCTURIA: Primary | ICD-10-CM

## 2018-04-19 LAB
BILIRUB SERPL-MCNC: NORMAL MG/DL
BLOOD URINE, POC: NORMAL
COLOR, POC UA: YELLOW
GLUCOSE UR QL STRIP: NORMAL
KETONES UR QL STRIP: NORMAL
LEUKOCYTE ESTERASE URINE, POC: NORMAL
NITRITE, POC UA: NORMAL
PH, POC UA: 5.5
PROTEIN, POC: NORMAL
SPECIFIC GRAVITY, POC UA: 1.02
UROBILINOGEN, POC UA: NORMAL

## 2018-04-19 PROCEDURE — 99999 PR PBB SHADOW E&M-EST. PATIENT-LVL III: CPT | Mod: PBBFAC,,, | Performed by: UROLOGY

## 2018-04-19 PROCEDURE — 99213 OFFICE O/P EST LOW 20 MIN: CPT | Mod: 25,S$GLB,, | Performed by: UROLOGY

## 2018-04-19 PROCEDURE — 81002 URINALYSIS NONAUTO W/O SCOPE: CPT | Mod: S$GLB,,, | Performed by: UROLOGY

## 2018-04-19 NOTE — PROGRESS NOTES
Subjective:       Patient ID: Toni Uriarte is a 88 y.o. male.    Chief Complaint: Follow-up    HPI     88 year old with urinary frequency and nocturia x6.  He says this has been an ongoing problems for years but is gradually getting worse.  He had TURP 20-25 years ago and after surgery he had improvement in his symptoms.  He began Flomax 2 months ago and now feels he has had a significant improvement in his symptoms.  Only got up once last night to void.  He denies hematuria and dysuria.  He is overall satisfied for now.   Urine dipstick shows negative for all components.  PVR 0 ml    Review of Systems   Constitutional: Negative for fever.   Genitourinary: Negative for dysuria and hematuria.       Objective:      Physical Exam   Constitutional: He is oriented to person, place, and time. He appears well-developed and well-nourished.   Pulmonary/Chest: Effort normal.   Neurological: He is alert and oriented to person, place, and time.   Skin: No rash noted.   Psychiatric: He has a normal mood and affect.   Vitals reviewed.      Assessment:       1. Nocturia    2. Enlarged prostate with urinary obstruction        Plan:       Nocturia  -     POCT urine dipstick without microscope    Enlarged prostate with urinary obstruction      Continue Flomax.  Annual follow up

## 2018-04-23 ENCOUNTER — HOSPITAL ENCOUNTER (OUTPATIENT)
Dept: RADIOLOGY | Facility: HOSPITAL | Age: 83
Discharge: HOME | End: 2018-04-23
Attending: PHYSICIAN ASSISTANT
Payer: MEDICARE

## 2018-04-23 ENCOUNTER — OFFICE VISIT (OUTPATIENT)
Dept: FAMILY MEDICINE | Facility: CLINIC | Age: 83
End: 2018-04-23
Payer: MEDICARE

## 2018-04-23 ENCOUNTER — TELEPHONE (OUTPATIENT)
Dept: FAMILY MEDICINE | Facility: CLINIC | Age: 83
End: 2018-04-23

## 2018-04-23 VITALS
HEART RATE: 88 BPM | RESPIRATION RATE: 16 BRPM | WEIGHT: 202.19 LBS | HEIGHT: 74 IN | BODY MASS INDEX: 25.95 KG/M2 | DIASTOLIC BLOOD PRESSURE: 50 MMHG | SYSTOLIC BLOOD PRESSURE: 90 MMHG

## 2018-04-23 DIAGNOSIS — J40 BRONCHITIS: ICD-10-CM

## 2018-04-23 DIAGNOSIS — R05.9 COUGH: Primary | ICD-10-CM

## 2018-04-23 DIAGNOSIS — R05.3 COUGH, PERSISTENT: ICD-10-CM

## 2018-04-23 DIAGNOSIS — R05.9 COUGH: ICD-10-CM

## 2018-04-23 DIAGNOSIS — R93.89 ABNORMAL X-RAY: Primary | ICD-10-CM

## 2018-04-23 PROCEDURE — 99999 PR PBB SHADOW E&M-EST. PATIENT-LVL IV: CPT | Mod: PBBFAC,,, | Performed by: PHYSICIAN ASSISTANT

## 2018-04-23 PROCEDURE — 71046 X-RAY EXAM CHEST 2 VIEWS: CPT | Mod: 26,,, | Performed by: RADIOLOGY

## 2018-04-23 PROCEDURE — 71046 X-RAY EXAM CHEST 2 VIEWS: CPT | Mod: TC,FY,PO

## 2018-04-23 PROCEDURE — 99214 OFFICE O/P EST MOD 30 MIN: CPT | Mod: S$GLB,,, | Performed by: PHYSICIAN ASSISTANT

## 2018-04-23 RX ORDER — LEVOFLOXACIN 500 MG/1
500 TABLET, FILM COATED ORAL DAILY
Qty: 7 TABLET | Refills: 0 | Status: ON HOLD | OUTPATIENT
Start: 2018-04-23 | End: 2018-05-02 | Stop reason: HOSPADM

## 2018-04-23 RX ORDER — BENZONATATE 200 MG/1
200 CAPSULE ORAL 3 TIMES DAILY PRN
Qty: 30 CAPSULE | Refills: 1 | Status: SHIPPED | OUTPATIENT
Start: 2018-04-23 | End: 2018-04-23 | Stop reason: SDUPTHER

## 2018-04-23 RX ORDER — BENZONATATE 200 MG/1
200 CAPSULE ORAL 3 TIMES DAILY PRN
Qty: 30 CAPSULE | Refills: 1 | Status: SHIPPED | OUTPATIENT
Start: 2018-04-23 | End: 2018-05-03

## 2018-04-23 NOTE — PROGRESS NOTES
Subjective:       Patient ID: Toni Uriarte is a 88 y.o. male    Chief Complaint: Cough (cough for a month.No expectorating anything.No fever.Patient have been taking Benzonatate that his daughter gave him.)    HPI  Mr Uriarte presents today CO cough and congestion for the past 1 month.  He has started taking his daughters tessalon pearls without relief.  He denies any fever or chills.  The cough is non-productive.      Review of Systems   Constitutional: Negative for chills and fever.   HENT: Positive for congestion. Negative for sore throat.    Eyes: Negative for visual disturbance.   Respiratory: Positive for cough. Negative for shortness of breath.    Cardiovascular: Negative for chest pain, palpitations and leg swelling.   Gastrointestinal: Negative for diarrhea, nausea and vomiting.   Musculoskeletal: Negative for arthralgias.   Skin: Negative for rash.   Neurological: Negative for headaches.   Psychiatric/Behavioral: Negative for sleep disturbance.        Objective:   Physical Exam   Constitutional: He is oriented to person, place, and time. He appears well-developed and well-nourished. No distress.   HENT:   Head: Normocephalic and atraumatic.   Right Ear: External ear normal.   Left Ear: External ear normal.   Nose: Nose normal.   Mouth/Throat: Oropharynx is clear and moist.   Eyes: Conjunctivae and EOM are normal. Pupils are equal, round, and reactive to light.   Neck: Normal range of motion. Neck supple. No JVD present.   Cardiovascular: Normal rate, regular rhythm and normal heart sounds.  Exam reveals no gallop and no friction rub.    No murmur heard.  Pulmonary/Chest: Effort normal and breath sounds normal. No respiratory distress. He has no wheezes. He has no rales.   Abdominal: Soft. Bowel sounds are normal. He exhibits no distension and no mass. There is no tenderness. There is no guarding.   Musculoskeletal: Normal range of motion. He exhibits no edema.   Using walker to ambulate    Neurological: He is alert and oriented to person, place, and time.   Skin: Skin is warm and dry.   Psychiatric: He has a normal mood and affect. His behavior is normal. Judgment and thought content normal.        Assessment:       1. Cough  X-Ray Chest PA And Lateral    DISCONTINUED: benzonatate (TESSALON) 200 MG capsule   2. Bronchitis  levoFLOXacin (LEVAQUIN) 500 MG tablet    benzonatate (TESSALON) 200 MG capsule        Plan:       Cough  -     X-Ray Chest PA And Lateral; Future; Expected date: 04/23/2018    Bronchitis  -     levoFLOXacin (LEVAQUIN) 500 MG tablet; Take 1 tablet (500 mg total) by mouth once daily.  Dispense: 7 tablet; Refill: 0  -     benzonatate (TESSALON) 200 MG capsule; Take 1 capsule (200 mg total) by mouth 3 (three) times daily as needed for Cough.  Dispense: 30 capsule; Refill: 1  - follow up in 3-4 days

## 2018-04-25 ENCOUNTER — TELEPHONE (OUTPATIENT)
Dept: FAMILY MEDICINE | Facility: CLINIC | Age: 83
End: 2018-04-25

## 2018-04-25 DIAGNOSIS — Z79.899 MEDICATION MANAGEMENT: Primary | ICD-10-CM

## 2018-04-25 NOTE — TELEPHONE ENCOUNTER
Daughter Mandy thinks patient having reaction to tessalon pearls and Levaquin. He is confused ,weakness, no fever,/53. Noticed it this morning. Also she checked his pills box and he might have taken extra meds.  Call daughter [phone]

## 2018-04-25 NOTE — TELEPHONE ENCOUNTER
I spoke with the patient's daughter Mandy who reports that Mr Uriarte was confused this morning and took his morning medications twice.  I have requested home health for better medication management and Mr Uriarte is scheduled for a chest CT and follow up with me on Friday.

## 2018-04-27 ENCOUNTER — OFFICE VISIT (OUTPATIENT)
Dept: FAMILY MEDICINE | Facility: CLINIC | Age: 83
End: 2018-04-27
Payer: MEDICARE

## 2018-04-27 ENCOUNTER — TELEPHONE (OUTPATIENT)
Dept: FAMILY MEDICINE | Facility: CLINIC | Age: 83
End: 2018-04-27

## 2018-04-27 ENCOUNTER — HOSPITAL ENCOUNTER (OUTPATIENT)
Dept: RADIOLOGY | Facility: HOSPITAL | Age: 83
Discharge: HOME | End: 2018-04-27
Attending: PHYSICIAN ASSISTANT
Payer: MEDICARE

## 2018-04-27 VITALS
DIASTOLIC BLOOD PRESSURE: 70 MMHG | WEIGHT: 201.25 LBS | RESPIRATION RATE: 12 BRPM | HEIGHT: 74 IN | OXYGEN SATURATION: 98 % | BODY MASS INDEX: 25.83 KG/M2 | SYSTOLIC BLOOD PRESSURE: 140 MMHG | HEART RATE: 80 BPM

## 2018-04-27 DIAGNOSIS — R05.3 COUGH, PERSISTENT: ICD-10-CM

## 2018-04-27 DIAGNOSIS — R93.89 ABNORMAL X-RAY: ICD-10-CM

## 2018-04-27 DIAGNOSIS — J90 PLEURAL EFFUSION: Primary | ICD-10-CM

## 2018-04-27 PROCEDURE — 99499 UNLISTED E&M SERVICE: CPT | Mod: S$GLB,,, | Performed by: PHYSICIAN ASSISTANT

## 2018-04-27 PROCEDURE — 99214 OFFICE O/P EST MOD 30 MIN: CPT | Mod: S$GLB,,, | Performed by: PHYSICIAN ASSISTANT

## 2018-04-27 PROCEDURE — 25500020 PHARM REV CODE 255: Mod: PO | Performed by: PHYSICIAN ASSISTANT

## 2018-04-27 PROCEDURE — 99999 PR PBB SHADOW E&M-EST. PATIENT-LVL III: CPT | Mod: PBBFAC,,, | Performed by: PHYSICIAN ASSISTANT

## 2018-04-27 PROCEDURE — 71260 CT THORAX DX C+: CPT | Mod: 26,,, | Performed by: RADIOLOGY

## 2018-04-27 PROCEDURE — 71260 CT THORAX DX C+: CPT | Mod: TC,PO

## 2018-04-27 RX ADMIN — IOHEXOL 75 ML: 350 INJECTION, SOLUTION INTRAVENOUS at 09:04

## 2018-04-27 NOTE — PROGRESS NOTES
Subjective:       Patient ID: Toni Uriarte is a 88 y.o. male    Chief Complaint: Cough (f/u on cough. CT scan review)    HPI  Mr Uriarte presents today for 3 day follow up for his cough.  I diagnosed bronchtis and started him Levaquin and Tessalon 3 days ago.  He has has a chest X-ray 3 days ago and Chest CT this morning.  He reports feeling worse then ever this morning and he continues to cough.      EXAMINATION:  XR CHEST PA AND LATERAL    CLINICAL HISTORY:  Cough    TECHNIQUE:  PA and lateral views of the chest were performed.    COMPARISON:  Chest x-ray-02/19/2018    FINDINGS:  The heart size is not enlarged.  There is prominent atherosclerotic calcification present within the aortic arch.  The aortic arch appears prominent in size, similar to the prior examination.  A thoracic aortic aneurysm cannot be excluded on the basis of this examination, and consideration should be given to performing a contrast enhanced CT of the chest.  There is a calcified granuloma projected over the left lower lung zone.  There has been interval development of bibasilar pleural fluid, right greater than left, with associated atelectasis versus pneumonitis at both lung bases, right greater than left.  No pneumothorax.  There are radiographic findings which could relate to chronic obstructive airways disease.  The bones are osteopenic.  There is multilevel degenerative change of the thoracic spine.  There is a remote superior endplate compression deformity present at approximately T9 is chronic anterior wedging of T8, similar to the previous exam.   Impression       Interval development of bibasilar pleural fluid and associated atelectasis/consolidation, right greater than left.    Prominence of the thoracic aortic arch.  A thoracic aortic aneurysm is not entirely excluded.  Consider correlation with contrast enhanced CT of the chest.    This report was flagged in Epic as abnormal.      Electronically signed by: Jermaine Villalobos  MD  Date: 04/23/2018  Time: 10:35     EXAMINATION:  CT CHEST WITH CONTRAST    CLINICAL HISTORY:  Cough, persistent;Abnormal findings on diagnostic imaging of other specified body structures    TECHNIQUE:  Low dose axial images, sagittal and coronal reformations were obtained from the thoracic inlet to the lung bases following the IV administration of 75 mL of Omnipaque 350.    COMPARISON:  Chest x-ray of April 23, 2018 and February a 19th 2018.    FINDINGS:  The heart and great vessels are of normal size and contour.  Enlargement or aneurysm is not seen.  Adenopathy or soft tissue mass is not noted.  There is trace pericardial effusion anteriorly.  There is significant coronary artery calcification noted.    There is a moderate size right pleural effusion and a small left pleural effusion.  This is produces severe atelectasis of the right lower lobe and mild atelectasis of the left lung base.  No pneumothorax is seen.    There are 2 calcified granulomas noted at the right lung apex, a calcified granuloma on the right major fissure more inferiorly, 2 small calcified granulomas at the left lung apex, 2 small calcified granulomas of the left major fissure superiorly and a moderate size calcified granuloma of the anterior basal segment of the left lower lobe.    There is however a 7 mm soft tissue density nodule in the right upper lobe near the lateral pleural surface.  There is a 7 mm soft tissue density nodule in the superior segment of the left lower lobe.  These most likely represent noncalcified granulomas but follow-up by Fleischner society guidelines is recommended.   Impression       Moderate size right pleural effusion and small left pleural effusion.  Severe atelectasis of the right lower lobe and mild atelectasis at the left lung base.  This was not seen on the chest x-ray of February 19, 2018. there is a trace pericardial effusion as well.  Significant coronary artery calcification is noted.    Multiple  calcified granulomas are seen in the lung fields bilaterally.  There is however a noncalcified 7 mm soft tissue density nodule in the right upper lobe and a 7 mm soft tissue density nodule in the superior segment of the left lower lobe.  These may represent noncalcified granulomas but follow-up per Fleischner society guidelines is recommended.      Electronically signed by: Davidson Gruber MD  Date: 04/27/2018  Time: 09:32    Encounter     View Encounter              Review of Systems   Constitutional: Positive for fatigue. Negative for chills and fever.   HENT: Negative for congestion and sore throat.    Eyes: Negative for pain.   Respiratory: Positive for cough and shortness of breath.    Cardiovascular: Negative for chest pain.   Gastrointestinal: Negative for abdominal pain, diarrhea, nausea and vomiting.   Musculoskeletal: Negative for myalgias and neck stiffness.   Skin: Negative for rash.   Neurological: Negative for headaches.        Objective:   Physical Exam   Constitutional: He is oriented to person, place, and time. He appears well-developed and well-nourished. No distress.   HENT:   Head: Normocephalic and atraumatic.   Right Ear: External ear normal.   Left Ear: External ear normal.   Nose: Nose normal.   Mouth/Throat: Oropharynx is clear and moist.   Eyes: Conjunctivae and EOM are normal. Pupils are equal, round, and reactive to light.   Neck: Normal range of motion. Neck supple. No JVD present.   Cardiovascular: Normal rate, regular rhythm and normal heart sounds.  Exam reveals no gallop and no friction rub.    No murmur heard.  Pulmonary/Chest: Effort normal. No respiratory distress. He has no wheezes. He has rales (basilar crackles heard bilaterally).   Abdominal: Soft. Bowel sounds are normal. He exhibits no distension and no mass. There is no tenderness. There is no guarding.   Musculoskeletal: Normal range of motion. He exhibits no edema.   Ambulates with a walker   Neurological: He is alert  and oriented to person, place, and time.   Skin: Skin is warm and dry.   Psychiatric: He has a normal mood and affect. His behavior is normal. Judgment and thought content normal.        Assessment:       1. Pleural effusion          Plan:       Pleural effusion  - Concerning findings on the chest CT done today.  Go to the ER now, patient is stable and agrees to go to the ER now.  He is using the [employer] transportation and they can pick him up now.    - I spoke with his daughter Zayda and let her know that he is going to the ER for further evaluation.

## 2018-04-29 PROBLEM — I42.9 CARDIOMYOPATHY: Status: ACTIVE | Noted: 2018-04-29

## 2018-04-30 PROBLEM — R53.1 GENERALIZED WEAKNESS: Status: ACTIVE | Noted: 2018-04-30

## 2018-04-30 PROBLEM — I50.41 ACUTE COMBINED SYSTOLIC (CONGESTIVE) AND DIASTOLIC (CONGESTIVE) HEART FAILURE: Status: ACTIVE | Noted: 2018-04-27

## 2018-05-15 DIAGNOSIS — R35.0 URINARY FREQUENCY: ICD-10-CM

## 2018-05-15 DIAGNOSIS — N40.0 BENIGN PROSTATIC HYPERPLASIA, UNSPECIFIED WHETHER LOWER URINARY TRACT SYMPTOMS PRESENT: ICD-10-CM

## 2018-05-15 NOTE — TELEPHONE ENCOUNTER
----- Message from Divya Polanco sent at 5/15/2018  1:03 PM CDT -----  Contact: Ramon with [facility]  The patient is telling the pharmacy that he is supposed to be taking his tamsulosin (FLOMAX) 0.4 mg Cp24, 2 daily and the pharmacy does not have any prescription for that, they have the patient taking it once daily.  Please call to confirm.  Call Back#[phone]  Thanks     [facility] - [place] [id number]  [facility] [facility] [address]  Phone: [phone] Fax: [phone]

## 2018-05-16 RX ORDER — TAMSULOSIN HYDROCHLORIDE 0.4 MG/1
0.8 CAPSULE ORAL NIGHTLY
Qty: 60 CAPSULE | Refills: 11 | Status: SHIPPED | OUTPATIENT
Start: 2018-05-16 | End: 2019-03-01 | Stop reason: SDUPTHER

## 2018-05-24 ENCOUNTER — OFFICE VISIT (OUTPATIENT)
Dept: CARDIOLOGY | Facility: CLINIC | Age: 83
End: 2018-05-24
Payer: MEDICARE

## 2018-05-24 VITALS
BODY MASS INDEX: 24.45 KG/M2 | HEIGHT: 74 IN | HEART RATE: 67 BPM | SYSTOLIC BLOOD PRESSURE: 138 MMHG | DIASTOLIC BLOOD PRESSURE: 70 MMHG | WEIGHT: 190.5 LBS

## 2018-05-24 DIAGNOSIS — E78.2 MIXED HYPERLIPIDEMIA: ICD-10-CM

## 2018-05-24 DIAGNOSIS — I42.9 CARDIOMYOPATHY, UNSPECIFIED TYPE: Primary | ICD-10-CM

## 2018-05-24 DIAGNOSIS — I10 ESSENTIAL HYPERTENSION: ICD-10-CM

## 2018-05-24 PROCEDURE — 99999 PR PBB SHADOW E&M-EST. PATIENT-LVL II: CPT | Mod: PBBFAC,,, | Performed by: INTERNAL MEDICINE

## 2018-05-24 PROCEDURE — 99214 OFFICE O/P EST MOD 30 MIN: CPT | Mod: S$GLB,,, | Performed by: INTERNAL MEDICINE

## 2018-05-24 PROCEDURE — 99499 UNLISTED E&M SERVICE: CPT | Mod: S$GLB,,, | Performed by: INTERNAL MEDICINE

## 2018-05-24 NOTE — PROGRESS NOTES
Subjective:    Patient ID:  Toni Uriarte is a 88 y.o. male who presents for follow-up of cardiomyopathy    HPI  Admitted to [facility] last month with atypical chest pain  [facility] (-)  He comes for follow up with no major problems, no chest pain, no shortness of breath.  FC II    Review of Systems   Constitution: Negative for decreased appetite, weakness, malaise/fatigue, weight gain and weight loss.   Cardiovascular: Negative for chest pain, dyspnea on exertion, leg swelling, palpitations and syncope.   Respiratory: Negative for cough and shortness of breath.    Gastrointestinal: Negative.    All other systems reviewed and are negative.       Objective:    Physical Exam   Constitutional: He is oriented to person, place, and time. He appears well-developed and well-nourished.   HENT:   Head: Normocephalic.   Eyes: Pupils are equal, round, and reactive to light.   Neck: Normal range of motion. Neck supple. No JVD present. Carotid bruit is not present. No thyromegaly present.   Cardiovascular: Normal rate, regular rhythm, normal heart sounds, intact distal pulses and normal pulses.  PMI is not displaced.  Exam reveals no gallop.    No murmur heard.  Pulmonary/Chest: Effort normal and breath sounds normal.   Abdominal: Soft. Normal appearance. He exhibits no mass. There is no hepatosplenomegaly. There is no tenderness.   Musculoskeletal: Normal range of motion. He exhibits no edema.   Neurological: He is alert and oriented to person, place, and time. He has normal strength and normal reflexes. No sensory deficit.   Skin: Skin is warm and intact.   Psychiatric: He has a normal mood and affect.   Nursing note and vitals reviewed.        Assessment:       1. Cardiomyopathy, unspecified type    2. Essential hypertension    3. Mixed hyperlipidemia         Plan:     Continue all cardiac medications  Regular exercise program  6 m f/u

## 2018-05-30 RX ORDER — LEVOTHYROXINE SODIUM 100 UG/1
100 TABLET ORAL DAILY
Qty: 90 TABLET | Refills: 3 | Status: CANCELLED | OUTPATIENT
Start: 2018-05-30

## 2018-05-30 RX ORDER — LEVOTHYROXINE SODIUM 100 UG/1
100 TABLET ORAL DAILY
Qty: 90 TABLET | Refills: 3 | Status: SHIPPED | OUTPATIENT
Start: 2018-05-30 | End: 2018-12-31 | Stop reason: SDUPTHER

## 2018-05-30 NOTE — TELEPHONE ENCOUNTER
----- Message from Maxim Rivers sent at 5/30/2018 12:38 PM CDT -----  Contact: Rashmi from [facility]  .Type:  Pharmacy Calling to Clarify an RX    Name of Caller: Rashmi  Pharmacy Name: [facility]  Prescription Name: levothyroxine (SYNTHROID) 100 MCG tablet  What do they need to clarify?: needs a new prescription   Best Call Back Number: Rashmi [phone]  Additional Information:

## 2018-05-30 NOTE — TELEPHONE ENCOUNTER
----- Message from Jomar Gonzalez sent at 5/30/2018  3:48 PM CDT -----  Contact: Rashmi Lawson, [phone]. Requesting refill for levothyroxine (SYNTHROID) 100 MCG tablet. Please advise. Thanks.

## 2018-06-01 ENCOUNTER — PES CALL (OUTPATIENT)
Dept: ADMINISTRATIVE | Facility: CLINIC | Age: 83
End: 2018-06-01

## 2018-06-19 NOTE — TELEPHONE ENCOUNTER
----- Message from Latoya Rincon sent at 6/19/2018  3:54 PM CDT -----  Contact: Irineo Caraballo  Refill request  furosemide (LASIX) 20 MG tablet    [facility] - [place], [place] - [facility] [address]  Phone: [phone] Fax: [phone]

## 2018-06-20 ENCOUNTER — OFFICE VISIT (OUTPATIENT)
Dept: FAMILY MEDICINE | Facility: CLINIC | Age: 83
End: 2018-06-20
Payer: MEDICARE

## 2018-06-20 VITALS
BODY MASS INDEX: 24.1 KG/M2 | DIASTOLIC BLOOD PRESSURE: 70 MMHG | WEIGHT: 187.81 LBS | SYSTOLIC BLOOD PRESSURE: 142 MMHG | OXYGEN SATURATION: 96 % | HEART RATE: 64 BPM | HEIGHT: 74 IN | RESPIRATION RATE: 16 BRPM

## 2018-06-20 DIAGNOSIS — Z00.00 ROUTINE HEALTH MAINTENANCE: Primary | ICD-10-CM

## 2018-06-20 DIAGNOSIS — I42.9 CARDIOMYOPATHY, UNSPECIFIED TYPE: ICD-10-CM

## 2018-06-20 DIAGNOSIS — G60.9 IDIOPATHIC PERIPHERAL NEUROPATHY: ICD-10-CM

## 2018-06-20 PROCEDURE — 99499 UNLISTED E&M SERVICE: CPT | Mod: S$GLB,,, | Performed by: FAMILY MEDICINE

## 2018-06-20 PROCEDURE — 99397 PER PM REEVAL EST PAT 65+ YR: CPT | Mod: S$GLB,,, | Performed by: FAMILY MEDICINE

## 2018-06-20 PROCEDURE — 99999 PR PBB SHADOW E&M-EST. PATIENT-LVL III: CPT | Mod: PBBFAC,,, | Performed by: FAMILY MEDICINE

## 2018-06-20 RX ORDER — FUROSEMIDE 20 MG/1
20 TABLET ORAL DAILY
Qty: 30 TABLET | Refills: 11 | Status: SHIPPED | OUTPATIENT
Start: 2018-06-20 | End: 2018-06-25 | Stop reason: SDUPTHER

## 2018-06-20 NOTE — PROGRESS NOTES
HPI  Toni Uirarte is a 88 y.o. male with multiple medical diagnoses as listed in the medical history and problem list that presents for Annual Exam  .      HPI  Here today for routine health maintenance.  Recent admission for CHF.  Resolved with diuresis.  Following with Cardiology.  Has weight loss since working on diet and exercise at [facility].    PAST MEDICAL HISTORY:  Past Medical History:   Diagnosis Date    Acute combined systolic (congestive) and diastolic (congestive) heart failure     Arthritis     Basal cell carcinoma     L helix 8-2015    BPH (benign prostatic hypertrophy)     Depression 2/19/2018    Disorder of kidney and ureter     CKD 3    GERD (gastroesophageal reflux disease)     Hyperlipidemia     Hypertension     Hypothyroid     VIKTORIA (obstructive sleep apnea)     Snoring     Squamous cell carcinoma     left ear s/p excision    Urinary incontinence     occasional leakage of urine       PAST SURGICAL HISTORY:  Past Surgical History:   Procedure Laterality Date    CATARACT EXTRACTION Bilateral     CHOLECYSTECTOMY      CIRCUMCISION      EYE SURGERY      GALLBLADDER SURGERY  2007    HEMORRHOID SURGERY      PROSTATE SURGERY      TONSILLECTOMY         SOCIAL HISTORY:  Social History     Social History    Marital status:      Spouse name: N/A    Number of children: N/A    Years of education: N/A     Occupational History    Not on file.     Social History Main Topics    Smoking status: Former Smoker     Quit date: 12/5/1985    Smokeless tobacco: Never Used    Alcohol use 0.0 oz/week      Comment: gin and tonic regularly    Drug use: No    Sexual activity: Not Currently     Other Topics Concern    Not on file     Social History Narrative    No narrative on file       FAMILY HISTORY:  Family History   Problem Relation Age of Onset    Cancer Mother         breast    Heart disease Mother     Heart disease Father     Stroke Father     Alzheimer's disease  "Brother     Arthritis Brother         back and neck       ALLERGIES AND MEDICATIONS: updated and reviewed.  Review of patient's allergies indicates:   Allergen Reactions    Demerol [meperidine] Other (See Comments)     arrest    Citalopram analogues      dizziness     Current Outpatient Prescriptions   Medication Sig Dispense Refill    acetaminophen (TYLENOL) 325 MG tablet Take 2 tablets (650 mg total) by mouth every 4 (four) hours as needed.  0    docusate sodium (COLACE) 100 MG capsule Take 1 capsule (100 mg total) by mouth 2 (two) times daily as needed for Constipation.  0    furosemide (LASIX) 20 MG tablet Take 1 tablet (20 mg total) by mouth once daily. 30 tablet 1    gabapentin (NEURONTIN) 300 MG capsule Take 1 capsule (300 mg total) by mouth 2 (two) times daily. 60 capsule 1    levothyroxine (SYNTHROID) 100 MCG tablet Take 1 tablet (100 mcg total) by mouth once daily. 90 tablet 3    lisinopril (PRINIVIL,ZESTRIL) 2.5 MG tablet Take 1 tablet (2.5 mg total) by mouth once daily. 30 tablet 1    melatonin 10 mg Cap Take by mouth every evening.       metoprolol succinate (TOPROL-XL) 25 MG 24 hr tablet Take 0.5 tablets (12.5 mg total) by mouth once daily. 15 tablet 1    OMEPRAZOLE (PRILOSEC ORAL) Take 20 mg by mouth once daily.       oxybutynin (DITROPAN) 5 MG Tab Take 1 tablet (5 mg total) by mouth every evening. 30 tablet 11    tamsulosin (FLOMAX) 0.4 mg Cp24 Take 2 capsules (0.8 mg total) by mouth every evening. 60 capsule 11     No current facility-administered medications for this visit.        ROS  Review of Systems    Physical Exam  Vitals:    06/20/18 1446   BP: (!) 142/70   Pulse: 64   Resp: 16    Body mass index is 24.12 kg/m².  Weight: 85.2 kg (187 lb 13.3 oz)   Height: 6' 2" (188 cm)     Physical Exam   Constitutional: He is oriented to person, place, and time. He appears well-developed and well-nourished. No distress.   HENT:   Head: Normocephalic and atraumatic.   Right Ear: External ear " normal.   Left Ear: External ear normal.   Eyes: Conjunctivae and EOM are normal. Pupils are equal, round, and reactive to light. No scleral icterus.   Neck: Normal range of motion. Neck supple. No JVD present. No thyromegaly present.   Cardiovascular: Normal rate, regular rhythm and intact distal pulses.  Exam reveals no gallop and no friction rub.    No murmur heard.  Pulmonary/Chest: Effort normal and breath sounds normal. He has no wheezes. He has no rales.   Abdominal: Soft. Bowel sounds are normal. He exhibits no distension and no mass. There is no tenderness.   Musculoskeletal: Normal range of motion. He exhibits no edema or tenderness.   Lymphadenopathy:     He has no cervical adenopathy.   Neurological: He is alert and oriented to person, place, and time. No cranial nerve deficit. Coordination normal.   Skin: Skin is warm and dry. No rash noted.   Psychiatric: He has a normal mood and affect.   Vitals reviewed.    Lab Results   Component Value Date    WBC 4.51 05/01/2018    HGB 12.9 (L) 05/01/2018    HCT 39.4 (L) 05/01/2018     05/01/2018    CHOL 143 10/27/2015    TRIG 98 10/27/2015    HDL 31 (L) 10/27/2015    ALT 23 04/27/2018    AST 20 04/27/2018     05/02/2018    K 3.9 05/02/2018     05/02/2018    CREATININE 1.06 05/02/2018    BUN 30 (H) 05/02/2018    CO2 29 05/02/2018    TSH 1.520 04/27/2018    PSA 5.85 (H) 06/28/2013    INR 1.0 09/10/2004    GLUF 99 11/22/2005    HGBA1C 5.0 04/25/2007        Health Maintenance       Date Due Completion Date    TETANUS VACCINE 02/03/1948 ---    Influenza Vaccine 08/01/2018 10/27/2017 (Done)    Override on 10/27/2017: Done    Override on 10/17/2016: Done    Lipid Panel 10/27/2020 10/27/2015          Assessment & Plan    Routine health maintenance  - Health maintenance reviewed  - Diet and exercise education.    Cardiomyopathy, unspecified type  - Continue current therapy  - Serial blood pressure monitoring  - Diet and exercise education.  - Continue  Cardiology    Idiopathic peripheral neuropathy  - Stable, Continue current therapy       Follow-up in about 6 months (around 12/20/2018).

## 2018-06-25 RX ORDER — METOPROLOL SUCCINATE 25 MG/1
12.5 TABLET, EXTENDED RELEASE ORAL DAILY
Qty: 15 TABLET | Refills: 11 | Status: SHIPPED | OUTPATIENT
Start: 2018-06-25 | End: 2019-03-01 | Stop reason: SDUPTHER

## 2018-06-25 RX ORDER — LISINOPRIL 2.5 MG/1
2.5 TABLET ORAL DAILY
Qty: 30 TABLET | Refills: 11 | Status: ON HOLD | OUTPATIENT
Start: 2018-06-25 | End: 2019-04-15 | Stop reason: HOSPADM

## 2018-06-25 RX ORDER — FUROSEMIDE 20 MG/1
20 TABLET ORAL DAILY
Qty: 30 TABLET | Refills: 11 | Status: ON HOLD | OUTPATIENT
Start: 2018-06-25 | End: 2019-04-15 | Stop reason: HOSPADM

## 2018-06-25 RX ORDER — TAMSULOSIN HYDROCHLORIDE 0.4 MG/1
CAPSULE ORAL
Qty: 90 CAPSULE | Refills: 3 | Status: SHIPPED | OUTPATIENT
Start: 2018-06-25 | End: 2018-08-22 | Stop reason: SDUPTHER

## 2018-06-25 NOTE — TELEPHONE ENCOUNTER
----- Message from Kaylynn Henderson sent at 6/25/2018  9:12 AM CDT -----  Contact: Rashmi from [facility]  Type:  Pharmacy Calling to Clarify an RX    Name of Caller:  Rashmi  Pharmacy Name:    [facility] [address] [address] [facility] [address] [address]  Phone: [phone] Fax: [phone]    [facility] [address] [facility] [address]  [address] [facility] [address]  Phone: [phone] Fax: [phone]  Prescription Name:    1. furosemide (LASIX) 20 MG tablet  2. lisinopril (PRINIVIL,ZESTRIL) 2.5 MG tablet  3. metoprolol succinate (TOPROL-XL) 25 MG 24 hr tablet  What do they need to clarify?:  Has sent refill request but no response from office  Best Call Back Number:  [phone]  Additional Information:  Asking for as soon as possible. Thanks!

## 2018-07-31 ENCOUNTER — PES CALL (OUTPATIENT)
Dept: ADMINISTRATIVE | Facility: CLINIC | Age: 83
End: 2018-07-31

## 2018-08-22 ENCOUNTER — OFFICE VISIT (OUTPATIENT)
Dept: FAMILY MEDICINE | Facility: CLINIC | Age: 83
End: 2018-08-22
Payer: MEDICARE

## 2018-08-22 DIAGNOSIS — Z00.00 ENCOUNTER FOR PREVENTIVE HEALTH EXAMINATION: Primary | ICD-10-CM

## 2018-08-22 DIAGNOSIS — H90.5 SENSORINEURAL HEARING LOSS (SNHL), UNSPECIFIED LATERALITY: ICD-10-CM

## 2018-08-22 DIAGNOSIS — G60.9 IDIOPATHIC PERIPHERAL NEUROPATHY: ICD-10-CM

## 2018-08-22 DIAGNOSIS — I50.42 CHRONIC COMBINED SYSTOLIC AND DIASTOLIC CONGESTIVE HEART FAILURE: ICD-10-CM

## 2018-08-22 DIAGNOSIS — I70.209 STENOSIS OF FEMORAL ARTERY: ICD-10-CM

## 2018-08-22 DIAGNOSIS — H93.293 IMPAIRMENT OF AUDITORY DISCRIMINATION OF BOTH EARS: ICD-10-CM

## 2018-08-22 DIAGNOSIS — R42 ORTHOSTATIC DIZZINESS: ICD-10-CM

## 2018-08-22 DIAGNOSIS — F32.A DEPRESSION, UNSPECIFIED DEPRESSION TYPE: ICD-10-CM

## 2018-08-22 DIAGNOSIS — G47.37 CENTRAL SLEEP APNEA DUE TO MEDICAL CONDITION: ICD-10-CM

## 2018-08-22 DIAGNOSIS — I70.202 TIBIAL ARTERY OCCLUSION, LEFT: ICD-10-CM

## 2018-08-22 DIAGNOSIS — R29.898 MUSCULAR DECONDITIONING: ICD-10-CM

## 2018-08-22 DIAGNOSIS — Z85.89 HISTORY OF SQUAMOUS CELL CARCINOMA: ICD-10-CM

## 2018-08-22 DIAGNOSIS — N18.30 CHRONIC KIDNEY DISEASE, STAGE III (MODERATE): ICD-10-CM

## 2018-08-22 DIAGNOSIS — E03.4 HYPOTHYROIDISM DUE TO ACQUIRED ATROPHY OF THYROID: ICD-10-CM

## 2018-08-22 DIAGNOSIS — J84.10 PULMONARY GRANULOMA: ICD-10-CM

## 2018-08-22 DIAGNOSIS — J90 PLEURAL EFFUSION: ICD-10-CM

## 2018-08-22 DIAGNOSIS — I70.0 AORTIC ATHEROSCLEROSIS: ICD-10-CM

## 2018-08-22 DIAGNOSIS — E78.2 MIXED HYPERLIPIDEMIA: ICD-10-CM

## 2018-08-22 DIAGNOSIS — G57.11 MERALGIA PARESTHETICA, RIGHT: ICD-10-CM

## 2018-08-22 DIAGNOSIS — I42.9 CARDIOMYOPATHY, UNSPECIFIED TYPE: ICD-10-CM

## 2018-08-22 DIAGNOSIS — R26.9 GAIT DISTURBANCE: ICD-10-CM

## 2018-08-22 DIAGNOSIS — I10 ESSENTIAL HYPERTENSION: ICD-10-CM

## 2018-08-22 DIAGNOSIS — R53.1 GENERALIZED WEAKNESS: ICD-10-CM

## 2018-08-22 PROBLEM — J10.1 INFLUENZA A: Status: RESOLVED | Noted: 2018-02-19 | Resolved: 2018-08-22

## 2018-08-22 PROCEDURE — 99499 UNLISTED E&M SERVICE: CPT | Mod: S$GLB,,, | Performed by: NURSE PRACTITIONER

## 2018-08-22 PROCEDURE — 99999 PR PBB SHADOW E&M-EST. PATIENT-LVL III: CPT | Mod: PBBFAC,,, | Performed by: NURSE PRACTITIONER

## 2018-08-22 PROCEDURE — G0439 PPPS, SUBSEQ VISIT: HCPCS | Mod: S$GLB,,, | Performed by: NURSE PRACTITIONER

## 2018-08-22 NOTE — PROGRESS NOTES
"Toni Uriarte presented for a  Medicare AWV and comprehensive Health Risk Assessment today. The following components were reviewed and updated:    · Medical history  · Family History  · Social history  · Allergies and Current Medications  · Health Risk Assessment  · Health Maintenance  · Care Team     ** See Completed Assessments for Annual Wellness Visit within the encounter summary.**       The following assessments were completed:  · Living Situation  · CAGE  · Depression Screening  · Timed Get Up and Go  · Whisper Test  · Cognitive Function Screening    ·   ·   · Nutrition Screening  · ADL Screening  · PAQ Screening    Vitals:    08/22/18 1101   BP: 132/62   BP Location: Left arm   Patient Position: Sitting   BP Method: Medium (Automatic)   Pulse: (!) 54   Weight: 88 kg (194 lb 0.1 oz)   Height: 6' 2" (1.88 m)     Body mass index is 24.91 kg/m².  Physical Exam   Constitutional: No distress.   HENT:   Head: Normocephalic.   Cardiovascular: Regular rhythm and normal heart sounds. Bradycardia present.   No murmur heard.  Pulmonary/Chest: Effort normal. No stridor. No respiratory distress.   Neurological: He is alert.   Skin:   Spooning nails noted   Vitals reviewed.        Diagnoses and health risks identified today and associated recommendations/orders:    1. Encounter for preventive health examination  Reviewed health maintenance and provided recommendations    Encourage tdap, flu and shingrix vaccines    2. Depression, unspecified depression type  Stable.   Continues to grieve th eloss of h is wife, however has learned that being social is helpful  No si/hi  Followed by Miguel Contreras MD .      3. Idiopathic peripheral neuropathy  Stable.     Followed by Miguel Contreras MD .      4. Meralgia paresthetica, right  Continue to monitor   Followed by Miguel Contreras MD .      5. 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      6. Sensorineural " hearing loss (SNHL), unspecified laterality  Stable.     Followed by Miguel Contreras MD .      7. Pleural effusion  Continue to monitor   Followed by Miguel Contreras MD .      8. Chronic combined systolic and diastolic congestive heart failure  .mon with echo  Followed by Ketan.      9. Cardiomyopathy, unspecified type  Continue to monitor   Followed by Ketan.      10. Essential hypertension  Stable.   Controlled on current medications.  Followed by Miguel Contreras MD .      11. Mixed hyperlipidemia  Continue to monitor lipids  Followed by Miguel Contreras MD .      12. Stenosis of femoral artery  Continue to monitor   Followed by Ketan.      13. Tibial artery occlusion, left  Continue to monitor   Followed by Ketan.      14. Chronic kidney disease, stage III (moderate)  Continue to monitor   Followed by Miguel Contreras MD .      15. History of squamous cell carcinoma  Continue skin checks as recommended by dermatology  Followed by Yris.      16. Hypothyroidism due to acquired atrophy of thyroid  Continue to monitor   Followed by Miguel Contreras MD .      17. Muscular deconditioning  Encourage activity  Followed by Miguel Contreras MD .      18. Gait disturbance  Discussed fall prevention  Followed by Miguel Contreras MD .      19. Generalized weakness  Continue to monitor   Followed by Miguel Contreras MD .      20. Orthostatic dizziness  Educate to transition slowly from sitting to standing and stay hydrated  Followed by Miguel Contreras MD .      21. Central sleep apnea due to medical condition  Continue to monitor   Followed by Miguel Contreras MD .      22. Aortic atherosclerosis  Continue to monitor   Followed by Ketan.    CXR 4/30/18    23. Pulmonary granuloma  Continue to monitor   Followed by Miguel Contreras MD .    CXR 4/30/18      Provided Toni Chavez with a 5-10 year written screening schedule and personal prevention plan. Recommendations were  developed using the USPSTF age appropriate recommendations. Education, counseling, and referrals were provided as needed. After Visit Summary printed and given to patient which includes a list of additional screenings\tests needed.    Follow-up in about 1 year (around 8/22/2019).    Cece Barajas NP

## 2018-08-22 NOTE — PATIENT INSTRUCTIONS
Counseling and Referral of Other Preventative  (Italic type indicates deductible and co-insurance are waived)    Patient Name: Toni Uriarte  Today's Date: 8/22/2018    Health Maintenance       Date Due Completion Date    TETANUS VACCINE 02/03/1948 ---    Influenza Vaccine 08/01/2018 10/27/2017 (Done)    Override on 10/27/2017: Done    Override on 10/17/2016: Done    Lipid Panel 10/27/2020 10/27/2015        No orders of the defined types were placed in this encounter.    The following information is provided to all patients.  This information is to help you find resources for any of the problems found today that may be affecting your health:                Living healthy guide: www.[facility].[place].gov      Understanding Diabetes: www.diabetes.org      Eating healthy: www.cdc.gov/healthyweight      CDC home safety checklist: www.cdc.gov/steadi/patient.html      Agency on Aging: www.goea.[place].[facility]      Alcoholics anonymous (AA): www.aa.org      Physical Activity: www.lane.nih.gov/gf9lvvb      Tobacco use: www.quitwithusla.org

## 2018-08-23 VITALS
HEIGHT: 74 IN | HEART RATE: 54 BPM | DIASTOLIC BLOOD PRESSURE: 62 MMHG | SYSTOLIC BLOOD PRESSURE: 132 MMHG | BODY MASS INDEX: 24.9 KG/M2 | WEIGHT: 194 LBS

## 2018-11-05 ENCOUNTER — TELEPHONE (OUTPATIENT)
Dept: FAMILY MEDICINE | Facility: CLINIC | Age: 83
End: 2018-11-05

## 2018-11-05 NOTE — TELEPHONE ENCOUNTER
Spoke to pt daughter to reschedule appt and she stated that the pt normally has labs and was not sure if he has had all the labs you need for this year. Pt daughter would like to know if he needs more and if so could you enter them? Please advise. Thanks.

## 2018-11-08 ENCOUNTER — TELEPHONE (OUTPATIENT)
Dept: AUDIOLOGY | Facility: CLINIC | Age: 83
End: 2018-11-08

## 2018-11-08 NOTE — TELEPHONE ENCOUNTER
I contacted the patient's daughter to ask her if the patient would like to renew his warranty on his hearing aids for another year. The warranty  on 10/25/2018. His daughter told me she would see him tonight and she would ask him what he wanted to do. I asked her to have him call the clinic back tomorrow  to let us know what he would like to do.     The patient declined.

## 2018-11-27 ENCOUNTER — OFFICE VISIT (OUTPATIENT)
Dept: CARDIOLOGY | Facility: CLINIC | Age: 83
End: 2018-11-27
Payer: MEDICARE

## 2018-11-27 VITALS
HEIGHT: 73 IN | WEIGHT: 198.63 LBS | DIASTOLIC BLOOD PRESSURE: 56 MMHG | HEART RATE: 67 BPM | BODY MASS INDEX: 26.33 KG/M2 | SYSTOLIC BLOOD PRESSURE: 108 MMHG

## 2018-11-27 DIAGNOSIS — I10 ESSENTIAL HYPERTENSION: ICD-10-CM

## 2018-11-27 DIAGNOSIS — I42.9 CARDIOMYOPATHY, UNSPECIFIED TYPE: Primary | ICD-10-CM

## 2018-11-27 DIAGNOSIS — I50.42 CHRONIC COMBINED SYSTOLIC AND DIASTOLIC CONGESTIVE HEART FAILURE: ICD-10-CM

## 2018-11-27 PROCEDURE — 99214 OFFICE O/P EST MOD 30 MIN: CPT | Mod: HCWC,S$GLB,, | Performed by: INTERNAL MEDICINE

## 2018-11-27 PROCEDURE — 1101F PT FALLS ASSESS-DOCD LE1/YR: CPT | Mod: CPTII,HCWC,S$GLB, | Performed by: INTERNAL MEDICINE

## 2018-11-27 PROCEDURE — 99999 PR PBB SHADOW E&M-EST. PATIENT-LVL III: CPT | Mod: PBBFAC,HCWC,, | Performed by: INTERNAL MEDICINE

## 2018-11-27 NOTE — PROGRESS NOTES
Subjective:    Patient ID:  Toni Uriarte is a 88 y.o. male who presents for follow-up of CMP    HPI  He comes for follow up with no major problems, no chest pain, no shortness of breath.  Doing much better    Review of Systems   Constitution: Negative for decreased appetite, weakness, malaise/fatigue, weight gain and weight loss.   Cardiovascular: Negative for chest pain, dyspnea on exertion, leg swelling, palpitations and syncope.   Respiratory: Negative for cough and shortness of breath.    Gastrointestinal: Negative.    All other systems reviewed and are negative.       Objective:      Physical Exam   Constitutional: He is oriented to person, place, and time. He appears well-developed and well-nourished.   HENT:   Head: Normocephalic.   Eyes: Pupils are equal, round, and reactive to light.   Neck: Normal range of motion. Neck supple. No JVD present. Carotid bruit is not present. No thyromegaly present.   Cardiovascular: Normal rate, regular rhythm, normal heart sounds, intact distal pulses and normal pulses. PMI is not displaced. Exam reveals no gallop.   No murmur heard.  Pulmonary/Chest: Effort normal and breath sounds normal.   Abdominal: Soft. Normal appearance. He exhibits no mass. There is no hepatosplenomegaly. There is no tenderness.   Musculoskeletal: Normal range of motion. He exhibits no edema.   Neurological: He is alert and oriented to person, place, and time. He has normal strength and normal reflexes. No sensory deficit.   Skin: Skin is warm and intact.   Psychiatric: He has a normal mood and affect.   Nursing note and vitals reviewed.        Assessment:       1. Cardiomyopathy, unspecified type    2. Chronic combined systolic and diastolic congestive heart failure    3. Essential hypertension         Plan:     Continue all cardiac medications  Regular exercise program  Weight loss  6 m f/u with [facility]

## 2018-12-31 RX ORDER — LEVOTHYROXINE SODIUM 100 UG/1
100 TABLET ORAL DAILY
Qty: 90 TABLET | Refills: 1 | Status: SHIPPED | OUTPATIENT
Start: 2018-12-31 | End: 2019-10-01 | Stop reason: SDUPTHER

## 2019-01-11 NOTE — TELEPHONE ENCOUNTER
----- Message from Latoya Rincon sent at 4/9/2018  9:45 AM CDT -----  Contact: Shea, daughter  Requesting call back regarding patient Flomax. She says that its not working. Call back number [phone]     32

## 2019-03-01 DIAGNOSIS — R35.0 URINARY FREQUENCY: ICD-10-CM

## 2019-03-01 DIAGNOSIS — N40.0 BENIGN PROSTATIC HYPERPLASIA, UNSPECIFIED WHETHER LOWER URINARY TRACT SYMPTOMS PRESENT: ICD-10-CM

## 2019-03-01 RX ORDER — TAMSULOSIN HYDROCHLORIDE 0.4 MG/1
0.8 CAPSULE ORAL NIGHTLY
Qty: 180 CAPSULE | Refills: 11 | Status: ON HOLD | OUTPATIENT
Start: 2019-03-01 | End: 2019-04-15 | Stop reason: HOSPADM

## 2019-03-01 RX ORDER — OXYBUTYNIN CHLORIDE 5 MG/1
5 TABLET ORAL NIGHTLY
Qty: 90 TABLET | Refills: 3 | Status: SHIPPED | OUTPATIENT
Start: 2019-03-01 | End: 2020-04-06

## 2019-03-01 NOTE — TELEPHONE ENCOUNTER
----- Message from Ailyn Morris sent at 3/1/2019  2:00 PM CST -----  Contact: patient  Type: Needs Medical Advice    Who Called:  patient  Best Call Back Number: [phone] (home) [phone] (work)  Additional Information: pt want a Calll back said he would like to change his Medication to 90 day on them all please all him if that can be done would like to be called back

## 2019-03-01 NOTE — TELEPHONE ENCOUNTER
----- Message from Ailyn Morris sent at 3/1/2019  1:57 PM CST -----  Contact: pt  Type: Needs Medical Advice    Who Called:  patient  Best Call Back Number: [phone] (home) [phone] (work)  Additional Information: pt want a all back said he would like to change his Medication to 90 day Metoprolol please all him if that can be done would like to be called back

## 2019-03-04 RX ORDER — METOPROLOL SUCCINATE 25 MG/1
12.5 TABLET, EXTENDED RELEASE ORAL DAILY
Qty: 45 TABLET | Refills: 1 | Status: SHIPPED | OUTPATIENT
Start: 2019-03-04 | End: 2019-06-02 | Stop reason: SDUPTHER
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
## 2019-04-12 PROBLEM — A53.0 POSITIVE RPR TEST: Status: ACTIVE | Noted: 2019-04-12

## 2019-04-12 PROBLEM — A49.8 CLOSTRIDIUM DIFFICILE INFECTION: Status: ACTIVE | Noted: 2019-04-12

## 2019-04-12 PROBLEM — I95.1 ORTHOSTATIC HYPOTENSION: Status: ACTIVE | Noted: 2019-04-12

## 2019-04-12 PROBLEM — R50.9 FEVER: Status: ACTIVE | Noted: 2019-04-12

## 2019-05-07 ENCOUNTER — OFFICE VISIT (OUTPATIENT)
Dept: FAMILY MEDICINE | Facility: CLINIC | Age: 84
End: 2019-05-07
Payer: MEDICARE

## 2019-05-07 VITALS
WEIGHT: 190.69 LBS | HEART RATE: 66 BPM | HEIGHT: 74 IN | DIASTOLIC BLOOD PRESSURE: 66 MMHG | BODY MASS INDEX: 24.47 KG/M2 | SYSTOLIC BLOOD PRESSURE: 128 MMHG | OXYGEN SATURATION: 97 %

## 2019-05-07 DIAGNOSIS — I70.202 TIBIAL ARTERY OCCLUSION, LEFT: ICD-10-CM

## 2019-05-07 DIAGNOSIS — I70.209 STENOSIS OF FEMORAL ARTERY: ICD-10-CM

## 2019-05-07 DIAGNOSIS — J84.10 PULMONARY GRANULOMA: ICD-10-CM

## 2019-05-07 DIAGNOSIS — Z00.00 ENCOUNTER FOR PREVENTIVE HEALTH EXAMINATION: Primary | ICD-10-CM

## 2019-05-07 DIAGNOSIS — I70.0 AORTIC ATHEROSCLEROSIS: ICD-10-CM

## 2019-05-07 DIAGNOSIS — I10 ESSENTIAL HYPERTENSION: ICD-10-CM

## 2019-05-07 DIAGNOSIS — E78.2 MIXED HYPERLIPIDEMIA: ICD-10-CM

## 2019-05-07 DIAGNOSIS — G62.9 PERIPHERAL POLYNEUROPATHY: ICD-10-CM

## 2019-05-07 DIAGNOSIS — N18.30 CHRONIC KIDNEY DISEASE, STAGE III (MODERATE): ICD-10-CM

## 2019-05-07 DIAGNOSIS — I50.42 CHRONIC COMBINED SYSTOLIC AND DIASTOLIC CONGESTIVE HEART FAILURE: ICD-10-CM

## 2019-05-07 DIAGNOSIS — I42.9 CARDIOMYOPATHY, UNSPECIFIED TYPE: ICD-10-CM

## 2019-05-07 PROCEDURE — 99499 UNLISTED E&M SERVICE: CPT | Mod: S$GLB,,, | Performed by: NURSE PRACTITIONER

## 2019-05-07 PROCEDURE — 99999 PR PBB SHADOW E&M-EST. PATIENT-LVL V: ICD-10-PCS | Mod: PBBFAC,HCNC,, | Performed by: NURSE PRACTITIONER

## 2019-05-07 PROCEDURE — 99499 RISK ADDL DX/OHS AUDIT: ICD-10-PCS | Mod: S$GLB,,, | Performed by: NURSE PRACTITIONER

## 2019-05-07 PROCEDURE — 99999 PR PBB SHADOW E&M-EST. PATIENT-LVL V: CPT | Mod: PBBFAC,HCNC,, | Performed by: NURSE PRACTITIONER

## 2019-05-07 PROCEDURE — G0439 PR MEDICARE ANNUAL WELLNESS SUBSEQUENT VISIT: ICD-10-PCS | Mod: HCNC,S$GLB,, | Performed by: NURSE PRACTITIONER

## 2019-05-07 PROCEDURE — G0439 PPPS, SUBSEQ VISIT: HCPCS | Mod: HCNC,S$GLB,, | Performed by: NURSE PRACTITIONER

## 2019-05-07 NOTE — PATIENT INSTRUCTIONS
Counseling and Referral of Other Preventative  (Italic type indicates deductible and co-insurance are waived)    Patient Name: Toni Uriarte  Today's Date: 5/7/2019    Health Maintenance       Date Due Completion Date    TETANUS VACCINE 02/03/1948 ---    Aspirin/Antiplatelet Therapy 02/03/1948 ---    Shingles Vaccine (2 of 3) 01/19/2016 11/24/2015    Influenza Vaccine 08/01/2019 10/3/2018    Override on 10/27/2017: Done    Override on 10/17/2016: Done    Lipid Panel 10/27/2020 10/27/2015        No orders of the defined types were placed in this encounter.    The following information is provided to all patients.  This information is to help you find resources for any of the problems found today that may be affecting your health:                Living healthy guide: www.[facility].[place].[facility]      Understanding Diabetes: www.diabetes.org      Eating healthy: www.cdc.gov/healthyweight      CDC home safety checklist: www.cdc.gov/steadi/patient.html      Agency on Aging: www.goea.[place].[facility]      Alcoholics anonymous (AA): www.aa.org      Physical Activity: www.lane.nih.gov/gv6plpu      Tobacco use: www.quitwithusla.org

## 2019-05-10 ENCOUNTER — OFFICE VISIT (OUTPATIENT)
Dept: FAMILY MEDICINE | Facility: CLINIC | Age: 84
End: 2019-05-10
Payer: MEDICARE

## 2019-05-10 VITALS
HEART RATE: 67 BPM | HEIGHT: 74 IN | WEIGHT: 189.38 LBS | BODY MASS INDEX: 24.3 KG/M2 | DIASTOLIC BLOOD PRESSURE: 82 MMHG | SYSTOLIC BLOOD PRESSURE: 139 MMHG

## 2019-05-10 DIAGNOSIS — I70.0 AORTIC ATHEROSCLEROSIS: ICD-10-CM

## 2019-05-10 DIAGNOSIS — N18.30 CKD (CHRONIC KIDNEY DISEASE) STAGE 3, GFR 30-59 ML/MIN: ICD-10-CM

## 2019-05-10 DIAGNOSIS — J84.10 PULMONARY GRANULOMA: ICD-10-CM

## 2019-05-10 DIAGNOSIS — I10 ESSENTIAL HYPERTENSION: ICD-10-CM

## 2019-05-10 DIAGNOSIS — M54.16 LUMBAR RADICULOPATHY: Primary | ICD-10-CM

## 2019-05-10 PROCEDURE — 99213 OFFICE O/P EST LOW 20 MIN: CPT | Mod: HCNC,S$GLB,, | Performed by: FAMILY MEDICINE

## 2019-05-10 PROCEDURE — 1101F PR PT FALLS ASSESS DOC 0-1 FALLS W/OUT INJ PAST YR: ICD-10-PCS | Mod: HCNC,CPTII,S$GLB, | Performed by: FAMILY MEDICINE

## 2019-05-10 PROCEDURE — 1101F PT FALLS ASSESS-DOCD LE1/YR: CPT | Mod: HCNC,CPTII,S$GLB, | Performed by: FAMILY MEDICINE

## 2019-05-10 PROCEDURE — 99213 PR OFFICE/OUTPT VISIT, EST, LEVL III, 20-29 MIN: ICD-10-PCS | Mod: HCNC,S$GLB,, | Performed by: FAMILY MEDICINE

## 2019-05-10 PROCEDURE — 99999 PR PBB SHADOW E&M-EST. PATIENT-LVL III: ICD-10-PCS | Mod: PBBFAC,HCNC,, | Performed by: FAMILY MEDICINE

## 2019-05-10 PROCEDURE — 99999 PR PBB SHADOW E&M-EST. PATIENT-LVL III: CPT | Mod: PBBFAC,HCNC,, | Performed by: FAMILY MEDICINE

## 2019-05-10 RX ORDER — OMEPRAZOLE 20 MG/1
20 CAPSULE, DELAYED RELEASE ORAL DAILY
Refills: 2 | COMMUNITY
Start: 2019-02-28

## 2019-05-10 NOTE — PROGRESS NOTES
Subjective:       Patient ID: Toni Uriarte is a 89 y.o. male    Chief Complaint: DDD (here for 6 month f/u. c/o balance,dizziness. Hm due tetanus,shingles 2 of 3,ASA therapy)    HPI  Here today for interval evaluation  He has had a recent fall.  He reports numbness and off balance with weakness of his legs.  Previous history of lumbar DDD.  After fall, began PT with limited improvement.    Review of Systems     Objective:   Physical Exam   Constitutional: He is oriented to person, place, and time. He appears well-developed and well-nourished. No distress.   Musculoskeletal:        Lumbar back: He exhibits normal range of motion, no tenderness, no pain and no spasm.   Neurological: He is alert and oriented to person, place, and time.   Vitals reviewed.       Assessment:       1. Lumbar radiculopathy          Plan:       Lumbar radiculopathy  - Continue PT  - Recheck as needed

## 2019-05-15 PROBLEM — F32.9 REACTIVE DEPRESSION: Status: ACTIVE | Noted: 2018-02-19

## 2019-05-15 PROBLEM — R50.9 FEVER: Status: RESOLVED | Noted: 2019-04-12 | Resolved: 2019-05-15

## 2019-05-15 NOTE — PROGRESS NOTES
"Toni Uriarte presented for a  Medicare AWV and comprehensive Health Risk Assessment today. The following components were reviewed and updated:    · Medical history  · Family History  · Social history  · Allergies and Current Medications  · Health Risk Assessment  · Health Maintenance  · Care Team     ** See Completed Assessments for Annual Wellness Visit within the encounter summary.**       The following assessments were completed:  · Living Situation  · CAGE  · Depression Screening  · Timed Get Up and Go  · Whisper Test  · Cognitive Function Screening          · Nutrition Screening  · ADL Screening  · PAQ Screening    Vitals:    05/07/19 1448 05/07/19 1500   BP: (!) 156/64 128/66   BP Location: Left arm    Patient Position: Sitting    BP Method: Medium (Manual)    Pulse: 66    SpO2: 97%    Weight: 86.5 kg (190 lb 11.2 oz)    Height: 6' 2" (1.88 m)      Body mass index is 24.48 kg/m².  Physical Exam   Constitutional: He is oriented to person, place, and time. He appears well-nourished. No distress.   HENT:   Head: Normocephalic.   Cardiovascular: Normal rate, regular rhythm and normal heart sounds.   No murmur heard.  Pulmonary/Chest: Effort normal. No stridor. No respiratory distress.   Neurological: He is alert and oriented to person, place, and time.   Skin: Skin is warm.   Spooning nails noted   Vitals reviewed.        Diagnoses and health risks identified today and associated recommendations/orders:    1. Encounter for preventive health examination  Reviewed health maintenance and provided recommendations   Written rx for shingrix provided     2. Peripheral polyneuropathy  Continue to monitor  Followed by Miguel Contreras MD .      3. Pulmonary granuloma  Continue to monitor  Followed by Miguel Contreras MD .      4. Mixed hyperlipidemia  Continue to monitor  Followed by Miguel Contreras MD .      5. Essential hypertension  Continue to monitor  Followed by Miguel Contreras MD .      6. Tibial " artery occlusion, left  Continue to monitor  Followed by Ketan.      7. Stenosis of femoral artery  Continue to monitor  Followed by Ketan.      8. Chronic combined systolic and diastolic congestive heart failure  Continue to monitor  Followed by Ketan.      9. Cardiomyopathy, unspecified type  Continue to monitor  Followed by Ketan.      10. Aortic atherosclerosis  Continue to monitor  Followed by Ketan.      11. Chronic kidney disease, stage III (moderate)  Continue to monitor  Followed by Miguel Contreras MD   Encourage adequate h2o intake.        Provided Toni Chavez with a 5-10 year written screening schedule and personal prevention plan. Recommendations were developed using the USPSTF age appropriate recommendations. Education, counseling, and referrals were provided as needed. After Visit Summary printed and given to patient which includes a list of additional screenings\tests needed.    Follow up in about 1 year (around 5/7/2020).    Cece Barajas NP

## 2019-05-20 ENCOUNTER — CLINICAL SUPPORT (OUTPATIENT)
Dept: CARDIOLOGY | Facility: CLINIC | Age: 84
End: 2019-05-20
Attending: INTERNAL MEDICINE
Payer: MEDICARE

## 2019-05-20 VITALS
HEART RATE: 66 BPM | SYSTOLIC BLOOD PRESSURE: 130 MMHG | HEIGHT: 74 IN | WEIGHT: 189.38 LBS | BODY MASS INDEX: 24.3 KG/M2 | DIASTOLIC BLOOD PRESSURE: 60 MMHG

## 2019-05-20 DIAGNOSIS — I42.9 CARDIOMYOPATHY, UNSPECIFIED TYPE: ICD-10-CM

## 2019-05-20 DIAGNOSIS — I10 ESSENTIAL HYPERTENSION: ICD-10-CM

## 2019-05-20 DIAGNOSIS — I50.42 CHRONIC COMBINED SYSTOLIC AND DIASTOLIC CONGESTIVE HEART FAILURE: ICD-10-CM

## 2019-05-20 PROCEDURE — 93306 TRANSTHORACIC ECHO (TTE) COMPLETE (CUPID ONLY): ICD-10-PCS | Mod: HCNC,S$GLB,, | Performed by: INTERNAL MEDICINE

## 2019-05-20 PROCEDURE — 93306 TTE W/DOPPLER COMPLETE: CPT | Mod: HCNC,S$GLB,, | Performed by: INTERNAL MEDICINE

## 2019-05-20 PROCEDURE — 99999 PR PBB SHADOW E&M-EST. PATIENT-LVL II: ICD-10-PCS | Mod: PBBFAC,HCNC,,

## 2019-05-20 PROCEDURE — 99999 PR PBB SHADOW E&M-EST. PATIENT-LVL II: CPT | Mod: PBBFAC,HCNC,,

## 2019-05-21 LAB
AV INDEX (PROSTH): 0.67
AV MEAN GRADIENT: 3.47 MMHG
AV PEAK GRADIENT: 4.75 MMHG
AV VALVE AREA: 3.11 CM2
AV VELOCITY RATIO: 0.68
BSA FOR ECHO PROCEDURE: 2.12 M2
CV ECHO LV RWT: 0.41 CM
DOP CALC AO PEAK VEL: 1.09 M/S
DOP CALC AO VTI: 27.51 CM
DOP CALC LVOT AREA: 4.67 CM2
DOP CALC LVOT DIAMETER: 2.44 CM
DOP CALC LVOT PEAK VEL: 0.74 M/S
DOP CALC LVOT STROKE VOLUME: 85.67 CM3
DOP CALCLVOT PEAK VEL VTI: 18.33 CM
E WAVE DECELERATION TIME: 377.67 MSEC
E/A RATIO: 0.61
E/E' RATIO: 13.11
ECHO LV POSTERIOR WALL: 0.99 CM (ref 0.6–1.1)
FRACTIONAL SHORTENING: 21 % (ref 28–44)
INTERVENTRICULAR SEPTUM: 1.12 CM (ref 0.6–1.1)
LA MAJOR: 4.82 CM
LA MINOR: 5.06 CM
LA WIDTH: 4.54 CM
LEFT ATRIUM SIZE: 4.5 CM
LEFT ATRIUM VOLUME INDEX: 40.4 ML/M2
LEFT ATRIUM VOLUME: 85.73 CM3
LEFT INTERNAL DIMENSION IN SYSTOLE: 3.79 CM (ref 2.1–4)
LEFT VENTRICLE DIASTOLIC VOLUME INDEX: 50.77 ML/M2
LEFT VENTRICLE DIASTOLIC VOLUME: 107.81 ML
LEFT VENTRICLE MASS INDEX: 86.5 G/M2
LEFT VENTRICLE SYSTOLIC VOLUME INDEX: 29 ML/M2
LEFT VENTRICLE SYSTOLIC VOLUME: 61.65 ML
LEFT VENTRICULAR INTERNAL DIMENSION IN DIASTOLE: 4.81 CM (ref 3.5–6)
LEFT VENTRICULAR MASS: 183.71 G
LV LATERAL E/E' RATIO: 11.8
LV SEPTAL E/E' RATIO: 14.75
MV PEAK A VEL: 0.96 M/S
MV PEAK E VEL: 0.59 M/S
PISA TR MAX VEL: 2.06 M/S
PULM VEIN S/D RATIO: 2.24
PV PEAK D VEL: 0.21 M/S
PV PEAK S VEL: 0.47 M/S
RA MAJOR: 4.8 CM
RA PRESSURE: 3 MMHG
RA WIDTH: 3.91 CM
RIGHT VENTRICULAR END-DIASTOLIC DIMENSION: 2.71 CM
SINUS: 3.88 CM
STJ: 3.6 CM
TDI LATERAL: 0.05
TDI SEPTAL: 0.04
TDI: 0.05
TR MAX PG: 16.97 MMHG
TRICUSPID ANNULAR PLANE SYSTOLIC EXCURSION: 2.51 CM
TV REST PULMONARY ARTERY PRESSURE: 20 MMHG

## 2019-05-27 NOTE — PROGRESS NOTES
Contacted patient, verified patient information. Advised patient with results per Dr. Padilla. Patient verbally agreed.    Patient also cancelled appointment 05/28/2019 because of the hardship pf transportation.

## 2019-06-02 RX ORDER — METOPROLOL SUCCINATE 25 MG/1
12.5 TABLET, EXTENDED RELEASE ORAL DAILY
Qty: 45 TABLET | Refills: 3 | Status: SHIPPED | OUTPATIENT
Start: 2019-06-02 | End: 2020-04-07 | Stop reason: SDUPTHER

## 2019-06-03 RX ORDER — TAMSULOSIN HYDROCHLORIDE 0.4 MG/1
CAPSULE ORAL
Qty: 60 CAPSULE | Refills: 11 | Status: SHIPPED | OUTPATIENT
Start: 2019-06-03 | End: 2020-10-19

## 2019-06-18 RX ORDER — LISINOPRIL 2.5 MG/1
2.5 TABLET ORAL DAILY
Qty: 90 TABLET | Refills: 3 | Status: SHIPPED | OUTPATIENT
Start: 2019-06-18 | End: 2020-07-09

## 2019-10-01 RX ORDER — LEVOTHYROXINE SODIUM 100 UG/1
100 TABLET ORAL DAILY
Qty: 90 TABLET | Refills: 2 | Status: SHIPPED | OUTPATIENT
Start: 2019-10-01 | End: 2021-02-09

## 2020-01-23 ENCOUNTER — TELEPHONE (OUTPATIENT)
Dept: FAMILY MEDICINE | Facility: CLINIC | Age: 85
End: 2020-01-23

## 2020-01-23 NOTE — TELEPHONE ENCOUNTER
----- Message from Nina New sent at 1/23/2020  9:13 AM CST -----  Contact: pt [phone]  Patient  Called and asked  If you will print out a list of his medications so he may have.

## 2020-01-23 NOTE — TELEPHONE ENCOUNTER
Spoke with patient and will print up a medication list for him to  at the . He may not get here until Feb. 10th that is his next appointment in this building.

## 2020-02-10 ENCOUNTER — HOSPITAL ENCOUNTER (OUTPATIENT)
Dept: RADIOLOGY | Facility: HOSPITAL | Age: 85
Discharge: HOME | End: 2020-02-10
Attending: HOSPITALIST
Payer: MEDICARE

## 2020-02-10 ENCOUNTER — CLINICAL SUPPORT (OUTPATIENT)
Dept: CARDIOLOGY | Facility: CLINIC | Age: 85
End: 2020-02-10
Attending: HOSPITALIST
Payer: MEDICARE

## 2020-02-10 VITALS
BODY MASS INDEX: 24.26 KG/M2 | SYSTOLIC BLOOD PRESSURE: 138 MMHG | WEIGHT: 189 LBS | HEART RATE: 67 BPM | DIASTOLIC BLOOD PRESSURE: 70 MMHG | HEIGHT: 74 IN

## 2020-02-10 DIAGNOSIS — R06.02 BREATH SHORTNESS: ICD-10-CM

## 2020-02-10 DIAGNOSIS — R06.02 SHORTNESS OF BREATH: ICD-10-CM

## 2020-02-10 PROCEDURE — 99999 PR PBB SHADOW E&M-EST. PATIENT-LVL II: ICD-10-PCS | Mod: PBBFAC,HCNC,,

## 2020-02-10 PROCEDURE — 71046 XR CHEST PA AND LATERAL: ICD-10-PCS | Mod: 26,HCNC,, | Performed by: RADIOLOGY

## 2020-02-10 PROCEDURE — 99999 PR PBB SHADOW E&M-EST. PATIENT-LVL II: CPT | Mod: PBBFAC,HCNC,,

## 2020-02-10 PROCEDURE — 93306 TTE W/DOPPLER COMPLETE: CPT | Mod: HCNC,S$GLB,, | Performed by: INTERNAL MEDICINE

## 2020-02-10 PROCEDURE — 71046 X-RAY EXAM CHEST 2 VIEWS: CPT | Mod: TC,HCNC,FY,PO

## 2020-02-10 PROCEDURE — 71046 X-RAY EXAM CHEST 2 VIEWS: CPT | Mod: 26,HCNC,, | Performed by: RADIOLOGY

## 2020-02-10 PROCEDURE — 93306 ECHO (CUPID ONLY): ICD-10-PCS | Mod: HCNC,S$GLB,, | Performed by: INTERNAL MEDICINE

## 2020-02-11 ENCOUNTER — TELEPHONE (OUTPATIENT)
Dept: CARDIOLOGY | Facility: CLINIC | Age: 85
End: 2020-02-11

## 2020-02-11 LAB
ASCENDING AORTA: 3.34 CM
AV INDEX (PROSTH): 0.62
AV MEAN GRADIENT: 3 MMHG
AV PEAK GRADIENT: 5 MMHG
AV VALVE AREA: 2.09 CM2
AV VELOCITY RATIO: 0.83
BSA FOR ECHO PROCEDURE: 2.12 M2
CV ECHO LV RWT: 0.35 CM
DOP CALC AO PEAK VEL: 1.12 M/S
DOP CALC AO VTI: 29.08 CM
DOP CALC LVOT AREA: 3.4 CM2
DOP CALC LVOT DIAMETER: 2.07 CM
DOP CALC LVOT PEAK VEL: 0.93 M/S
DOP CALC LVOT STROKE VOLUME: 60.85 CM3
DOP CALCLVOT PEAK VEL VTI: 18.09 CM
E WAVE DECELERATION TIME: 247.91 MSEC
E/A RATIO: 2.83
E/E' RATIO: 26 M/S
ECHO LV POSTERIOR WALL: 0.99 CM (ref 0.6–1.1)
FRACTIONAL SHORTENING: 22 % (ref 28–44)
INTERVENTRICULAR SEPTUM: 1.01 CM (ref 0.6–1.1)
IVRT: 0.09 MSEC
LA MAJOR: 5.9 CM
LA MINOR: 5.74 CM
LA WIDTH: 4.79 CM
LEFT ATRIUM SIZE: 3.65 CM
LEFT ATRIUM VOLUME INDEX: 40.8 ML/M2
LEFT ATRIUM VOLUME: 86.47 CM3
LEFT INTERNAL DIMENSION IN SYSTOLE: 4.45 CM (ref 2.1–4)
LEFT VENTRICLE DIASTOLIC VOLUME INDEX: 74.92 ML/M2
LEFT VENTRICLE DIASTOLIC VOLUME: 158.94 ML
LEFT VENTRICLE MASS INDEX: 106 G/M2
LEFT VENTRICLE SYSTOLIC VOLUME INDEX: 42.6 ML/M2
LEFT VENTRICLE SYSTOLIC VOLUME: 90.27 ML
LEFT VENTRICULAR INTERNAL DIMENSION IN DIASTOLE: 5.68 CM (ref 3.5–6)
LEFT VENTRICULAR MASS: 225.02 G
LV LATERAL E/E' RATIO: 21.67 M/S
LV SEPTAL E/E' RATIO: 32.5 M/S
MV PEAK A VEL: 0.46 M/S
MV PEAK E VEL: 1.3 M/S
PISA TR MAX VEL: 3.44 M/S
PULM VEIN S/D RATIO: 0.79
PV PEAK D VEL: 0.66 M/S
PV PEAK S VEL: 0.52 M/S
RA MAJOR: 5.02 CM
RA PRESSURE: 8 MMHG
RA WIDTH: 4.29 CM
RIGHT VENTRICULAR END-DIASTOLIC DIMENSION: 4.13 CM
RV TISSUE DOPPLER FREE WALL SYSTOLIC VELOCITY 1 (APICAL 4 CHAMBER VIEW): 13.47 CM/S
SINUS: 3.65 CM
STJ: 3.35 CM
TDI LATERAL: 0.06 M/S
TDI SEPTAL: 0.04 M/S
TDI: 0.05 M/S
TR MAX PG: 47 MMHG
TRICUSPID ANNULAR PLANE SYSTOLIC EXCURSION: 3.09 CM
TV REST PULMONARY ARTERY PRESSURE: 55 MMHG

## 2020-02-11 NOTE — TELEPHONE ENCOUNTER
----- Message from Ailyn Morris sent at 2/11/2020 11:06 AM CST -----  Contact: patient  Type: Needs Medical Advice    Who Called:  patient  Best Call Back Number: [phone]  Additional Information: the an appt for test results he declined the next avail said he needs to be seen this week or sooner than march

## 2020-02-12 ENCOUNTER — PATIENT MESSAGE (OUTPATIENT)
Dept: CARDIOLOGY | Facility: CLINIC | Age: 85
End: 2020-02-12

## 2020-02-17 ENCOUNTER — TELEPHONE (OUTPATIENT)
Dept: CARDIOLOGY | Facility: CLINIC | Age: 85
End: 2020-02-17

## 2020-02-17 NOTE — TELEPHONE ENCOUNTER
----- Message from Ave Cheung sent at 2/17/2020 11:28 AM CST -----  Type: Needs Medical Advice    Who Called:  Patient   Best Call Back Number: [phone]  Additional Information: contact patient regarding his xray and Echo, his doctor at [facility] advised him to contact office regarding if he needs to be seen, he does not use the  [facility] chart to read any reply's.

## 2020-02-26 ENCOUNTER — PES CALL (OUTPATIENT)
Dept: ADMINISTRATIVE | Facility: CLINIC | Age: 85
End: 2020-02-26

## 2020-03-04 ENCOUNTER — TELEPHONE (OUTPATIENT)
Dept: FAMILY MEDICINE | Facility: CLINIC | Age: 85
End: 2020-03-04

## 2020-03-04 NOTE — TELEPHONE ENCOUNTER
Spoke to pt. Pt states that he received a call from a nurse practitioner at [facility] yesterday stating that they would be coming out to see pt for HH, and pt states he tried to call the number back today but the number was not in service. Pt would like to know who this could be? I do not see any orders for HH. Do you know who this could be?

## 2020-03-04 NOTE — TELEPHONE ENCOUNTER
----- Message from Shin Quiroz sent at 3/4/2020  2:26 PM CST -----  Type: Needs Medical Advice    Who Called:  Patient    Best Call Back Number: [phone]  Additional Information: Patient states that he has been receiving calls from a possible home health nurse that wants to schedule with him but he isn't aware of anyone scheduled to see him and the return call number wasn't a working number.  Please call to advise.

## 2020-03-19 NOTE — PROGRESS NOTES
Refill Authorization Note     is requesting a refill authorization.    Brief assessment and rationale for refill: DEFER: previously discontinued          Medication Therapy Plan: Previously discontinued at hospital 4/19; pharmacy is now requesting; unclear why; defer to your care                              Comments:   Refill Center Care Gap Closure protocols temporarily suspended.   Requested Prescriptions   Pending Prescriptions Disp Refills    furosemide (LASIX) 20 MG tablet [Pharmacy Med Name: FUROSEMIDE 20 MG TABS 20 TAB] 90 tablet 3     Sig: TAKE 1 TABLET (20 MG TOTAL) BY MOUTH ONCE DAILY.       Cardiovascular:  Diuretics - Loop Failed - 3/19/2020  3:49 PM        Failed - K in normal range and within 180 days     Potassium   Date Value Ref Range Status   04/15/2019 3.7 3.5 - 5.1 mmol/L Final   04/14/2019 3.6 3.5 - 5.1 mmol/L Final   04/13/2019 3.8 3.5 - 5.1 mmol/L Final              Failed - Na is between 130 and 148 and within 180 days     Sodium   Date Value Ref Range Status   04/15/2019 137 136 - 145 mmol/L Final   04/14/2019 137 136 - 145 mmol/L Final   04/13/2019 139 136 - 145 mmol/L Final              Failed - Cr is 1.3 or below and within 180 days     Creatinine   Date Value Ref Range Status   04/15/2019 0.80 0.50 - 1.40 mg/dL Final   04/14/2019 0.85 0.50 - 1.40 mg/dL Final   04/13/2019 1.01 0.50 - 1.40 mg/dL Final              Failed - eGFR in normal range and within 180 days     eGFR if non    Date Value Ref Range Status   04/15/2019 >60 >60 mL/min/1.73 m^2 Final     Comment:     Calculation used to obtain the estimated glomerular filtration  rate (eGFR) is the CKD-EPI equation.      04/14/2019 >60 >60 mL/min/1.73 m^2 Final     Comment:     Calculation used to obtain the estimated glomerular filtration  rate (eGFR) is the CKD-EPI equation.      04/13/2019 >60 >60 mL/min/1.73 m^2 Final     Comment:     Calculation used to obtain the estimated glomerular filtration  rate  (eGFR) is the CKD-EPI equation.        eGFR if    Date Value Ref Range Status   04/15/2019 >60 >60 mL/min/1.73 m^2 Final   04/14/2019 >60 >60 mL/min/1.73 m^2 Final   04/13/2019 >60 >60 mL/min/1.73 m^2 Final              Passed - Patient is at least 18 years old        Passed - Last BP in normal range within 360 days.     BP Readings from Last 3 Encounters:   02/10/20 138/70   05/20/19 130/60   05/10/19 139/82              Passed - Office visit in past 12 months or future 90 days.     Recent Outpatient Visits            10 months ago Lumbar radiculopathy    [facility] Miguel Contreras MD    10 months ago Encounter for preventive health examination    [facility] Medicine Cece Barajas NP    1 year ago Cardiomyopathy, unspecified type    [facility] Cardiology Patrice Aceves MD    1 year ago Encounter for preventive health examination    [facility] Medicine Cece Barajas NP    1 year ago Routine health maintenance    [facility] Medicine Miguel Contreras MD                     Appointments  past 12m or future 3m with PCP    Date Provider   Last Visit   5/10/2019 Miguel Contreras MD   Next Visit   Visit date not found Miguel Contreras MD   .  ED visits in past 90 days: 0       Note composed:3:51 PM 03/19/2020

## 2020-03-24 NOTE — TELEPHONE ENCOUNTER
"Pt states that he received a mail order rx from [email or web address] and is currently taking LASIKS daily, advised that this medications was discontinued and pt states that he is "alittle white lately and losing weight". Will stop medication now   "

## 2020-03-25 RX ORDER — FUROSEMIDE 20 MG/1
20 TABLET ORAL DAILY
Qty: 90 TABLET | Refills: 0 | OUTPATIENT
Start: 2020-03-25 | End: 2021-03-20

## 2020-04-06 DIAGNOSIS — R35.0 URINARY FREQUENCY: ICD-10-CM

## 2020-04-06 DIAGNOSIS — N40.0 BENIGN PROSTATIC HYPERPLASIA, UNSPECIFIED WHETHER LOWER URINARY TRACT SYMPTOMS PRESENT: ICD-10-CM

## 2020-04-06 RX ORDER — OXYBUTYNIN CHLORIDE 5 MG/1
5 TABLET ORAL NIGHTLY
Qty: 90 TABLET | Refills: 3 | Status: SHIPPED | OUTPATIENT
Start: 2020-04-06 | End: 2023-11-05

## 2020-04-07 NOTE — TELEPHONE ENCOUNTER
----- Message from Ester Haynes sent at 4/7/2020 11:55 AM CDT -----  Type:  Pharmacy Calling to Clarify an RX    Name of Caller:  Rashmi   Pharmacy Name:  [facility] Prescription Name:  Metoprolol   What do they need to clarify?:  Dosage   Best Call Back Number:  [phone] Additional Information:

## 2020-04-13 RX ORDER — METOPROLOL SUCCINATE 25 MG/1
12.5 TABLET, EXTENDED RELEASE ORAL DAILY
Qty: 45 TABLET | Refills: 0 | Status: SHIPPED | OUTPATIENT
Start: 2020-04-13 | End: 2020-08-12

## 2020-04-13 NOTE — PROGRESS NOTES
Refill Authorization Note     is requesting a refill authorization.    Brief assessment and rationale for refill: REVIEW: DDzi     Medication-related problems identified: Drug-disease interaction    Medication Therapy Plan: DDzi(stenosis of femoral artery and orthostatic hypotension), review    Medication reconciliation completed: No   Pharmacist Review Requested: Yes                     Comments:   Refill Center Care Gap Closure protocols temporarily suspended.   Requested Prescriptions   Pending Prescriptions Disp Refills    metoprolol succinate (TOPROL-XL) 25 MG 24 hr tablet 45 tablet 0     Sig: Take 0.5 tablets (12.5 mg total) by mouth once daily.       Cardiovascular:  Beta Blockers Passed - 4/7/2020  2:09 PM        Passed - Patient is at least 18 years old        Passed - Last BP in normal range within 360 days.     BP Readings from Last 3 Encounters:   02/10/20 138/70   05/20/19 130/60   05/10/19 139/82              Passed - Last Heart Rate in normal range within 360 days.     Pulse Readings from Last 3 Encounters:   02/10/20 67   05/20/19 66   05/10/19 67             Passed - Office visit in past 12 months or future 90 days.     Recent Outpatient Visits            11 months ago Lumbar radiculopathy    [facility] Medicine Miguel Contreras MD    11 months ago Encounter for preventive health examination    [facility] Medicine Cece Barajas NP    1 year ago Cardiomyopathy, unspecified type    [facility] Cardiology Patrice Aceves MD    1 year ago Encounter for preventive health examination    [facility] Medicine Cece Barajas NP    1 year ago Routine health maintenance    [facility] Miguel Contreras MD                     Appointments  past 12m or future 3m with PCP    Date Provider   Last Visit   5/10/2019 Miguel Contreras MD   Next Visit   Visit date not found Miguel Contreras MD   .  ED visits in past 90 days: 0        Note composed:10:21 AM 04/13/2020

## 2020-05-06 ENCOUNTER — PATIENT MESSAGE (OUTPATIENT)
Dept: ADMINISTRATIVE | Facility: HOSPITAL | Age: 85
End: 2020-05-06

## 2020-05-28 ENCOUNTER — PES CALL (OUTPATIENT)
Dept: ADMINISTRATIVE | Facility: CLINIC | Age: 85
End: 2020-05-28

## 2020-06-03 ENCOUNTER — OFFICE VISIT (OUTPATIENT)
Dept: HOME HEALTH SERVICES | Facility: CLINIC | Age: 85
End: 2020-06-03
Payer: MEDICARE

## 2020-06-03 VITALS
WEIGHT: 187 LBS | HEART RATE: 63 BPM | SYSTOLIC BLOOD PRESSURE: 156 MMHG | DIASTOLIC BLOOD PRESSURE: 73 MMHG | OXYGEN SATURATION: 98 % | BODY MASS INDEX: 24 KG/M2 | HEIGHT: 74 IN

## 2020-06-03 DIAGNOSIS — N18.30 CHRONIC KIDNEY DISEASE, STAGE III (MODERATE): ICD-10-CM

## 2020-06-03 DIAGNOSIS — Z85.89 HISTORY OF SQUAMOUS CELL CARCINOMA: ICD-10-CM

## 2020-06-03 DIAGNOSIS — R26.9 GAIT DISTURBANCE: ICD-10-CM

## 2020-06-03 DIAGNOSIS — E78.2 MIXED HYPERLIPIDEMIA: ICD-10-CM

## 2020-06-03 DIAGNOSIS — I50.42 CHRONIC COMBINED SYSTOLIC AND DIASTOLIC CONGESTIVE HEART FAILURE: ICD-10-CM

## 2020-06-03 DIAGNOSIS — I65.23 BILATERAL CAROTID ARTERY STENOSIS: ICD-10-CM

## 2020-06-03 DIAGNOSIS — H90.3 SENSORINEURAL HEARING LOSS (SNHL) OF BOTH EARS: ICD-10-CM

## 2020-06-03 DIAGNOSIS — I70.0 AORTIC ATHEROSCLEROSIS: ICD-10-CM

## 2020-06-03 DIAGNOSIS — J84.10 PULMONARY GRANULOMA: ICD-10-CM

## 2020-06-03 DIAGNOSIS — Z86.19 HISTORY OF CLOSTRIDIOIDES DIFFICILE COLITIS: ICD-10-CM

## 2020-06-03 DIAGNOSIS — I10 ESSENTIAL HYPERTENSION: ICD-10-CM

## 2020-06-03 DIAGNOSIS — H91.93 HEARING DIFFICULTY OF BOTH EARS: ICD-10-CM

## 2020-06-03 DIAGNOSIS — Z46.1 FITTING AND ADJUSTMENT OF HEARING AID: ICD-10-CM

## 2020-06-03 DIAGNOSIS — I50.32 DIASTOLIC DYSFUNCTION WITH CHRONIC HEART FAILURE: ICD-10-CM

## 2020-06-03 DIAGNOSIS — E03.4 HYPOTHYROIDISM DUE TO ACQUIRED ATROPHY OF THYROID: ICD-10-CM

## 2020-06-03 DIAGNOSIS — Z91.81 HISTORY OF FALLING: ICD-10-CM

## 2020-06-03 DIAGNOSIS — R53.1 GENERALIZED WEAKNESS: ICD-10-CM

## 2020-06-03 DIAGNOSIS — F10.20 UNCOMPLICATED ALCOHOL DEPENDENCE: ICD-10-CM

## 2020-06-03 DIAGNOSIS — Z00.00 ENCOUNTER FOR PREVENTIVE HEALTH EXAMINATION: Primary | ICD-10-CM

## 2020-06-03 DIAGNOSIS — I42.9 CARDIOMYOPATHY, UNSPECIFIED TYPE: ICD-10-CM

## 2020-06-03 PROCEDURE — 99499 UNLISTED E&M SERVICE: CPT | Mod: S$GLB,,, | Performed by: NURSE PRACTITIONER

## 2020-06-03 PROCEDURE — G0439 PR MEDICARE ANNUAL WELLNESS SUBSEQUENT VISIT: ICD-10-PCS | Mod: S$GLB,,, | Performed by: NURSE PRACTITIONER

## 2020-06-03 PROCEDURE — 99499 RISK ADDL DX/OHS AUDIT: ICD-10-PCS | Mod: S$GLB,,, | Performed by: NURSE PRACTITIONER

## 2020-06-03 PROCEDURE — G0439 PPPS, SUBSEQ VISIT: HCPCS | Mod: S$GLB,,, | Performed by: NURSE PRACTITIONER

## 2020-06-03 RX ORDER — FUROSEMIDE 20 MG/1
20 TABLET ORAL DAILY PRN
COMMUNITY
Start: 2020-03-13

## 2020-06-03 RX ORDER — GABAPENTIN 300 MG/1
300 CAPSULE ORAL 2 TIMES DAILY
COMMUNITY
End: 2023-10-27

## 2020-06-07 PROBLEM — I50.32 DIASTOLIC DYSFUNCTION WITH CHRONIC HEART FAILURE: Status: ACTIVE | Noted: 2020-06-07

## 2020-06-07 PROBLEM — A49.8 CLOSTRIDIUM DIFFICILE INFECTION: Status: RESOLVED | Noted: 2019-04-12 | Resolved: 2020-06-07

## 2020-06-07 PROBLEM — F10.20 UNCOMPLICATED ALCOHOL DEPENDENCE: Status: ACTIVE | Noted: 2020-06-07

## 2020-06-07 PROBLEM — I65.23 BILATERAL CAROTID ARTERY STENOSIS: Status: ACTIVE | Noted: 2020-06-07

## 2020-06-07 PROBLEM — A53.0 POSITIVE RPR TEST: Status: RESOLVED | Noted: 2019-04-12 | Resolved: 2020-06-07

## 2020-06-07 PROBLEM — Z86.19 HISTORY OF CLOSTRIDIOIDES DIFFICILE COLITIS: Status: ACTIVE | Noted: 2020-06-07

## 2020-06-07 NOTE — PROGRESS NOTES
"Toni Uriarte presented for a  Medicare AWV and comprehensive Health Risk Assessment today. The following components were reviewed and updated:    · Medical history  · Family History  · Social history  · Allergies and Current Medications  · Health Risk Assessment  · Health Maintenance  · Care Team     ** See Completed Assessments for Annual Wellness Visit within the encounter summary.**       The following assessments were completed:  · Living Situation  · CAGE  · Depression Screening  · Timed Get Up and Go  · Whisper Test  · Cognitive Function Screening      ·   · Nutrition Screening  · ADL Screening  · PAQ Screening    Vitals:    06/03/20 1229   BP: (!) 156/73   Pulse: 63   SpO2: 98%   Weight: 84.8 kg (187 lb)   Height: 6' 2" (1.88 m)     Body mass index is 24.01 kg/m².  Physical Exam   Constitutional: He is oriented to person, place, and time. He appears well-developed and well-nourished.   HENT:   Head: Normocephalic and atraumatic.   Eyes: Pupils are equal, round, and reactive to light. EOM are normal.   Neck: Normal range of motion. Neck supple.   Cardiovascular: Normal rate, regular rhythm and normal heart sounds.   Pulmonary/Chest: Effort normal and breath sounds normal.   Abdominal: Soft. Bowel sounds are normal.   Neurological: He is alert and oriented to person, place, and time.   Skin: Skin is warm and dry.   Psychiatric: He has a normal mood and affect. His behavior is normal.   Nursing note and vitals reviewed.        Diagnoses and health risks identified today and associated recommendations/orders:    1. Encounter for preventive health examination  AWV complete    2. Hearing difficulty of both ears  Referral to audiology     3. Fitting and adjustment of hearing aid  Referral to audiology     4. Sensorineural hearing loss (SNHL) of both ears  Needs hearing aids checked   - Ambulatory referral/consult to Audiology; Future    5. History of falling  Discussed safety   Followed with PCP    6. " Urinary complication  Stable on meds - followed with PCP    7. Diastolic dysfunction with chronic heart failure  Stable- followed with Cardiology    8. Pulmonary granuloma  Stable - seen on CXR - followed with PCP    9. Essential hypertension  Stable on meds   Followed with PCP    10. Mixed hyperlipidemia  Stable on meds - followed with PCP    11. Aortic atherosclerosis  Stable- followed by cardiology     12. Cardiomyopathy, unspecified type  Stable on meds - followed with cardiology     13. Chronic combined systolic and diastolic congestive heart failure  Stable on meds   Followed by Cardiology     14. Bilateral carotid artery stenosis  Per US - stable - followed with cardiology     15. Chronic kidney disease, stage III (moderate)  Avoid Nsaids - stable - followed with PCP    16. History of Clostridioides difficile colitis  Stable - no increased diarrhea - followed by PCP    17. History of squamous cell carcinoma  Stable - fu with dermatoloy     18  Hypothyroidism due to acquired atrophy of thyroid  Stable- on meds - followed with PCP    19. Gait disturbance  Discussed for safety - monitored by PCP    20. Generalized weakness  Stable - encouraged safety  Followed with PCP    21. Uncomplicated alcohol dependence  Stable - followed by PCP       Provided Toni Chavez with a 5-10 year written screening schedule and personal prevention plan. Recommendations were developed using the USPSTF age appropriate recommendations. Education, counseling, and referrals were provided as needed. After Visit Summary printed and given to patient which includes a list of additional screenings\tests needed.    Fu yearly for TAD Kessler NP  I offered to discuss end of life issues, including information on how to make advance directives that the patient could use to name someone who would make medical decisions on their behalf if they became too ill to make themselves.    ___Patient declined  _X_Patient is  interested, I provided paper work and offered to discuss.

## 2020-06-07 NOTE — PATIENT INSTRUCTIONS
Counseling and Referral of Other Preventative  (Italic type indicates deductible and co-insurance are waived)    Patient Name: Toni Uriarte  Today's Date: 6/7/2020    Health Maintenance       Date Due Completion Date    TETANUS VACCINE 02/03/1948 ---    Aspirin/Antiplatelet Therapy 02/03/1948 ---    Shingles Vaccine (2 of 3) 01/19/2016 11/24/2015    Influenza Vaccine (Season Ended) 09/01/2020 10/3/2018    Lipid Panel 03/25/2024 3/25/2019        Orders Placed This Encounter   Procedures    Ambulatory referral/consult to Audiology     The following information is provided to all patients.  This information is to help you find resources for any of the problems found today that may be affecting your health:                Living healthy guide: www.[facility].[place].gov      Understanding Diabetes: www.diabetes.org      Eating healthy: www.cdc.gov/healthyweight      [facility] home safety checklist: www.cdc.gov/steadi/patient.html      Agency on Aging: www.goea.[place].[facility]      Alcoholics anonymous (AA): www.aa.org      Physical Activity: www.lane.nih.gov/jz5cmpp      Tobacco use: www.quitwithusla.org

## 2020-06-09 ENCOUNTER — PATIENT OUTREACH (OUTPATIENT)
Dept: ADMINISTRATIVE | Facility: OTHER | Age: 85
End: 2020-06-09

## 2020-06-11 ENCOUNTER — CLINICAL SUPPORT (OUTPATIENT)
Dept: AUDIOLOGY | Facility: CLINIC | Age: 85
End: 2020-06-11
Payer: MEDICARE

## 2020-06-11 ENCOUNTER — OFFICE VISIT (OUTPATIENT)
Dept: OTOLARYNGOLOGY | Facility: CLINIC | Age: 85
End: 2020-06-11
Payer: MEDICARE

## 2020-06-11 VITALS — WEIGHT: 192.88 LBS | BODY MASS INDEX: 24.75 KG/M2 | HEIGHT: 74 IN

## 2020-06-11 DIAGNOSIS — H61.23 BILATERAL IMPACTED CERUMEN: ICD-10-CM

## 2020-06-11 DIAGNOSIS — H93.291 IMPAIRED AUDITORY DISCRIMINATION, RIGHT: ICD-10-CM

## 2020-06-11 DIAGNOSIS — Z97.4 WEARS HEARING AID IN BOTH EARS: ICD-10-CM

## 2020-06-11 DIAGNOSIS — H90.3 BILATERAL HIGH FREQUENCY SENSORINEURAL HEARING LOSS: ICD-10-CM

## 2020-06-11 DIAGNOSIS — H90.3 BILATERAL SENSORINEURAL HEARING LOSS: Primary | ICD-10-CM

## 2020-06-11 PROCEDURE — 99999 PR PBB SHADOW E&M-EST. PATIENT-LVL II: ICD-10-PCS | Mod: PBBFAC,HCNC,,

## 2020-06-11 PROCEDURE — 92557 PR COMPREHENSIVE HEARING TEST: ICD-10-PCS | Mod: HCNC,S$GLB,, | Performed by: AUDIOLOGIST-HEARING AID FITTER

## 2020-06-11 PROCEDURE — G0268 PR REMOVAL OF IMPACTED WAX MD: ICD-10-PCS | Mod: HCNC,S$GLB,, | Performed by: NURSE PRACTITIONER

## 2020-06-11 PROCEDURE — 99999 PR PBB SHADOW E&M-EST. PATIENT-LVL III: CPT | Mod: PBBFAC,HCNC,, | Performed by: NURSE PRACTITIONER

## 2020-06-11 PROCEDURE — 99999 PR PBB SHADOW E&M-EST. PATIENT-LVL II: CPT | Mod: PBBFAC,HCNC,,

## 2020-06-11 PROCEDURE — G0268 REMOVAL OF IMPACTED WAX MD: HCPCS | Mod: HCNC,S$GLB,, | Performed by: NURSE PRACTITIONER

## 2020-06-11 PROCEDURE — 1159F PR MEDICATION LIST DOCUMENTED IN MEDICAL RECORD: ICD-10-PCS | Mod: HCNC,S$GLB,, | Performed by: NURSE PRACTITIONER

## 2020-06-11 PROCEDURE — 99214 PR OFFICE/OUTPT VISIT, EST, LEVL IV, 30-39 MIN: ICD-10-PCS | Mod: 25,HCNC,S$GLB, | Performed by: NURSE PRACTITIONER

## 2020-06-11 PROCEDURE — 99214 OFFICE O/P EST MOD 30 MIN: CPT | Mod: 25,HCNC,S$GLB, | Performed by: NURSE PRACTITIONER

## 2020-06-11 PROCEDURE — 1159F MED LIST DOCD IN RCRD: CPT | Mod: HCNC,S$GLB,, | Performed by: NURSE PRACTITIONER

## 2020-06-11 PROCEDURE — 1126F PR PAIN SEVERITY QUANTIFIED, NO PAIN PRESENT: ICD-10-PCS | Mod: HCNC,S$GLB,, | Performed by: NURSE PRACTITIONER

## 2020-06-11 PROCEDURE — 92557 COMPREHENSIVE HEARING TEST: CPT | Mod: HCNC,S$GLB,, | Performed by: AUDIOLOGIST-HEARING AID FITTER

## 2020-06-11 PROCEDURE — 1101F PT FALLS ASSESS-DOCD LE1/YR: CPT | Mod: HCNC,CPTII,S$GLB, | Performed by: NURSE PRACTITIONER

## 2020-06-11 PROCEDURE — 99999 PR PBB SHADOW E&M-EST. PATIENT-LVL III: ICD-10-PCS | Mod: PBBFAC,HCNC,, | Performed by: NURSE PRACTITIONER

## 2020-06-11 PROCEDURE — 1101F PR PT FALLS ASSESS DOC 0-1 FALLS W/OUT INJ PAST YR: ICD-10-PCS | Mod: HCNC,CPTII,S$GLB, | Performed by: NURSE PRACTITIONER

## 2020-06-11 PROCEDURE — 1126F AMNT PAIN NOTED NONE PRSNT: CPT | Mod: HCNC,S$GLB,, | Performed by: NURSE PRACTITIONER

## 2020-06-11 NOTE — PROGRESS NOTES
Toni Uriarte was seen 06/11/2020 for an audiological evaluation. Patient complains of hearing loss. 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 He purchased hearing aids in 2015 but has not worn them very much. He denies tinnitus and family Hx of hearing loss.    Results reveal a bilateral normal-to-profound HF sensorineural hearing loss.  Some changes were noted in the high freq range for the left ear when compared to previous hearing testing from 9/6/17.  Speech Reception Thresholds were  20 dBHL for the right ear and 25 dBHL for the left ear.    Word recognition scores were fair for the right ear and good for the left ear.       Audiogram results were reviewed in detail with patient and all questions were answered. Results will be reviewed by ENT at the completion of this note. Recommend continued daily use of binaural amplification, hearing test in one year due to noise exposure and hearing protection in loud noise. Pt was seen immediately following this encounter for a HA adjustment.

## 2020-06-11 NOTE — PROGRESS NOTES
The patient was seen for a hearing aid follow-up appointment after having a hearing evaluation performed today.  Today's hearing thresholds were entered into the hearing aid software, and the programming for the left and right hearing aid was recalculated using the current hearing thresholds.  The right and left hearing aid was cleaned and checked.  A listening check revealed each aid to sound good.  The patient reported he has not worn the hearing aids in 2-3 years.  Instruction was given on proper insertion of the aids, and the patient practiced this today.  He had difficulty inserting the right aid.  He reported he wishes to practice inserting the aids at home.  It was recommended that he possibly consider cShells if he continues to have difficulty inserting the hearing aids. An annual audiological evaluation was recommended.  The patient will contact us as needed.

## 2020-06-11 NOTE — PROGRESS NOTES
Subjective:       Patient ID: Toni Uriarte is a 89 y.o. male.    Chief Complaint: No chief complaint on file.    Hearing Loss: No ear pain, no fever and no rhinorrhea.     Patient seen by me <3 years ago for hearing loss. He returns today for same: ear cleaning, annual audiogram.     Review of Systems   Constitutional: Negative.  Negative for fatigue and fever.   HENT: Positive for hearing loss. Negative for ear pain, rhinorrhea and sore throat.    Eyes: Negative.    Respiratory: Negative.  Negative for cough, shortness of breath and wheezing.    Cardiovascular: Negative.    Gastrointestinal: Negative.  Negative for nausea.   Musculoskeletal: Negative.  Negative for gait problem.   Skin: Negative.  Negative for color change, pallor and rash.   Neurological: Negative.    Hematological: Negative.  Negative for adenopathy.   Psychiatric/Behavioral: Negative.  Negative for agitation.       Objective:      Physical Exam   Constitutional: He is oriented to person, place, and time. Vital signs are normal. He appears well-developed and well-nourished. He is cooperative. He does not appear ill. No distress.   Ambulates slowly with a cane   HENT:   Head: Normocephalic and atraumatic.   Right Ear: Hearing, tympanic membrane, external ear and ear canal normal. Tympanic membrane is not erythematous. No middle ear effusion.   Left Ear: Hearing, tympanic membrane, external ear and ear canal normal. Tympanic membrane is not erythematous.  No middle ear effusion.   Nose: Nose normal. No mucosal edema, rhinorrhea or septal deviation. Right sinus exhibits no maxillary sinus tenderness and no frontal sinus tenderness. Left sinus exhibits no maxillary sinus tenderness and no frontal sinus tenderness.   Mouth/Throat: Uvula is midline, oropharynx is clear and moist and mucous membranes are normal. Mucous membranes are not pale, not dry and not cyanotic. No oral lesions. No oropharyngeal exudate, posterior oropharyngeal edema or  posterior oropharyngeal erythema.     SEPARATE PROCEDURE IN OFFICE:   Procedure: Removal of impacted cerumen, bilateral   Pre Procedure Diagnosis: Cerumen Impaction   Post Procedure Diagnosis: Cerumen Impaction   Verbal informed consent in regards to risk of trauma to ear canal, ear drum or hearing, discomfort during procedure and/or inability to remove cerumen impaction in one session or unforeseen events or complications.   No anesthesia.     Procedure in detail:   Ear canal visualized bilateral with appropriate size ear speculum utilizing Operating Head Binocular Otomicroscope   Utilizing the following: Ring curet and alligator forceps AU. The impacted cerumen of the ear canals was removed atraumatically. The TM and EAC were then inspected and found to be clear of wax. See description of TMs/EACs in PE above.   Complications: No   Condition: Improved/Good     Eyes: Pupils are equal, round, and reactive to light. Conjunctivae, EOM and lids are normal. Right eye exhibits no discharge. Left eye exhibits no discharge. No scleral icterus.   Neck: Trachea normal and normal range of motion. Neck supple. No tracheal deviation present. No thyroid mass and no thyromegaly present.   Cardiovascular: Normal rate.   Pulmonary/Chest: Effort normal. No stridor. No respiratory distress. He has no wheezes.   Musculoskeletal: Normal range of motion.   Lymphadenopathy:        Head (right side): No submental, no submandibular, no tonsillar, no preauricular and no posterior auricular adenopathy present.        Head (left side): No submental, no submandibular, no tonsillar, no preauricular and no posterior auricular adenopathy present.     He has no cervical adenopathy.        Right cervical: No superficial cervical and no posterior cervical adenopathy present.       Left cervical: No superficial cervical and no posterior cervical adenopathy present.   Neurological: He is alert and oriented to person, place, and time. He has normal  strength. Coordination and gait normal.   Skin: Skin is warm, dry and intact. No lesion and no rash noted. He is not diaphoretic. No cyanosis. No pallor.   Psychiatric: He has a normal mood and affect. His speech is normal and behavior is normal. Judgment and thought content normal. Cognition and memory are normal.   Nursing note and vitals reviewed.    Assessment:     Normal sloping to profound high-frequency sensorineural hearing loss each ear    Impaired auditory discrimination AS>AD  Cerumen impactions removed AU  Plan:     Encouraged daily use of hearing aids    Return to clinic as needed for further ENT concerns.

## 2020-07-09 DIAGNOSIS — E03.4 HYPOTHYROIDISM DUE TO ACQUIRED ATROPHY OF THYROID: ICD-10-CM

## 2020-07-09 DIAGNOSIS — I10 ESSENTIAL HYPERTENSION: ICD-10-CM

## 2020-07-09 DIAGNOSIS — E78.2 MIXED HYPERLIPIDEMIA: Primary | ICD-10-CM

## 2020-07-09 NOTE — PROGRESS NOTES
Refill Routing Note   Medication(s) are not appropriate for processing by [facility]:       - Required vitals are abnormal  - Drug-Disease Interaction (lisinopriL and Orthostatic hypotension)     Medication-related problems identified:   Requires labs  Drug-disease interaction  Requires appointment  Medication Therapy Plan: BP elevated at home nurse visit; Needs appt(Annual); NTBO(TSH/CMP/LIPID); Drug-Disease: lisinopriL and Orthostatic hypotension; Severe Warning for hypotension; Defer to you  Medication reconciliation completed: No      Automatic Epic Generated Protocol Data:    Requested Prescriptions   Pending Prescriptions Disp Refills    lisinopriL (PRINIVIL,ZESTRIL) 2.5 MG tablet [Pharmacy Med Name: LISINOPRIL 2.5 MG TAB 2.5 TAB] 90 tablet 0     Sig: TAKE 1 TABLET (2.5 MG TOTAL) BY MOUTH ONCE DAILY.       Cardiovascular:  ACE Inhibitors Failed - 7/9/2020 11:37 AM        Failed - Last BP in normal range within 360 days.     BP Readings from Last 3 Encounters:   06/03/20 (!) 156/73   02/10/20 138/70   05/20/19 130/60              Failed - Office visit in past 12 months or future 90 days.     Recent Outpatient Visits            4 weeks ago Bilateral sensorineural hearing loss    [facility] ENT Lizy Salcido NP    1 month ago Encounter for preventive health examination    [facility] Aleksandra Kessler NP    1 year ago Lumbar radiculopathy    [facility] Medicine Miguel Contreras MD    1 year ago Encounter for preventive health examination    [facility] Medicine Cece Barajas NP    1 year ago Cardiomyopathy, unspecified type    [facility] Cardiology Patrice Aceves MD                    Failed - Cr is 1.3 or below and within 360 days     Creatinine   Date Value Ref Range Status   04/15/2019 0.80 0.50 - 1.40 mg/dL Final   04/14/2019 0.85 0.50 - 1.40 mg/dL Final   04/13/2019 1.01 0.50 - 1.40 mg/dL Final              Failed - K in normal range and  within 360 days     Potassium   Date Value Ref Range Status   04/15/2019 3.7 3.5 - 5.1 mmol/L Final   04/14/2019 3.6 3.5 - 5.1 mmol/L Final   04/13/2019 3.8 3.5 - 5.1 mmol/L Final              Failed - eGFR within 360 days     eGFR if non    Date Value Ref Range Status   04/15/2019 >60 >60 mL/min/1.73 m^2 Final     Comment:     Calculation used to obtain the estimated glomerular filtration  rate (eGFR) is the CKD-EPI equation.      04/14/2019 >60 >60 mL/min/1.73 m^2 Final     Comment:     Calculation used to obtain the estimated glomerular filtration  rate (eGFR) is the CKD-EPI equation.      04/13/2019 >60 >60 mL/min/1.73 m^2 Final     Comment:     Calculation used to obtain the estimated glomerular filtration  rate (eGFR) is the CKD-EPI equation.        eGFR if    Date Value Ref Range Status   04/15/2019 >60 >60 mL/min/1.73 m^2 Final   04/14/2019 >60 >60 mL/min/1.73 m^2 Final   04/13/2019 >60 >60 mL/min/1.73 m^2 Final              Passed - Patient is at least 18 years old              Appointments  past 12m or future 3m with PCP    Date Provider   Last Visit   5/10/2019 Miguel Contreras MD   Next Visit   Visit date not found Miguel Contreras MD   ED visits in past 90 days: 0     Note composed:1:31 PM 07/09/2020

## 2020-07-13 ENCOUNTER — TELEPHONE (OUTPATIENT)
Dept: FAMILY MEDICINE | Facility: CLINIC | Age: 85
End: 2020-07-13

## 2020-07-13 RX ORDER — LISINOPRIL 2.5 MG/1
2.5 TABLET ORAL DAILY
Qty: 90 TABLET | Refills: 0 | Status: ON HOLD | OUTPATIENT
Start: 2020-07-13 | End: 2023-11-28 | Stop reason: HOSPADM

## 2020-07-13 NOTE — TELEPHONE ENCOUNTER
----- Message from Jesús Mckeon sent at 7/13/2020  3:35 PM CDT -----  Type: Needs Medical Advice    Who Called:  Patient  Best Call Back Number: [phone]  Additional Information: Patient would like to discuss medications. Please call to advise. Thanks!

## 2020-07-13 NOTE — TELEPHONE ENCOUNTER
Spoke with patient.  He received 2 bottles of lisinopril. He needed clarification on which dose he is supposed to take. 2.5 mg. Verbalized understanding. Stated [facility] is checking out why he received 2 different doses.

## 2020-07-13 NOTE — TELEPHONE ENCOUNTER
----- Message from Ave Cheung sent at 7/13/2020  3:41 PM CDT -----  Contact: self  Type: Needs Medical Advice  Who Called:  patient     Pharmacy name and phone #:    [facility] - [place] [address] [place] [address] [facility] [address]  Phone: [phone] Fax: [phone]  Best Call Back Number: [phone]  Additional Information: contact regarding medication lisinopriL (PRINIVIL,ZESTRIL) 2.5 MG tablet, patient states he received 2 prescriptions one for 5mg,(received 07/09//2020) and 2.5mg,(received today) want to know what it was changed..

## 2020-07-14 ENCOUNTER — TELEPHONE (OUTPATIENT)
Dept: FAMILY MEDICINE | Facility: CLINIC | Age: 85
End: 2020-07-14

## 2020-08-10 ENCOUNTER — TELEPHONE (OUTPATIENT)
Dept: CARDIOLOGY | Facility: CLINIC | Age: 85
End: 2020-08-10

## 2020-08-10 NOTE — TELEPHONE ENCOUNTER
PT C/O BP FLUCTUATING AND HAVING FATIGUE IN AM, ADVISED TO TAKE TOPROL IN PM AND LISINOPRIL IN AM.

## 2020-08-10 NOTE — TELEPHONE ENCOUNTER
----- Message from Anabela Valles sent at 8/10/2020  9:49 AM CDT -----  Regarding: advice  Contact: Patient/[phone] (home)  Type: Needs Medical Advice  Who Called:  Patient/[phone] (home)  Additional Information: Would like to talk to office concerning his blood pressure and medication. Please call to advise.

## 2020-09-29 ENCOUNTER — PATIENT MESSAGE (OUTPATIENT)
Dept: OTHER | Facility: OTHER | Age: 85
End: 2020-09-29

## 2020-10-01 ENCOUNTER — PATIENT MESSAGE (OUTPATIENT)
Dept: OTHER | Facility: OTHER | Age: 85
End: 2020-10-01

## 2020-10-02 ENCOUNTER — CLINICAL SUPPORT (OUTPATIENT)
Dept: AUDIOLOGY | Facility: CLINIC | Age: 85
End: 2020-10-02
Payer: MEDICARE

## 2020-10-02 PROCEDURE — 99999 PR PBB SHADOW E&M-EST. PATIENT-LVL I: CPT | Mod: PBBFAC,HCNC,, | Performed by: AUDIOLOGIST

## 2020-10-02 PROCEDURE — 99999 PR PBB SHADOW E&M-EST. PATIENT-LVL I: ICD-10-PCS | Mod: PBBFAC,HCNC,, | Performed by: AUDIOLOGIST

## 2020-10-02 NOTE — PROGRESS NOTES
The patient was seen in the hearing aid clinic and reported the left aid is not working well.  After changing the wax filter on the left and right aid, a listening check of the left and right aid revealed each aid to sound good.  The patient will contact us as needed.

## 2020-10-05 ENCOUNTER — PATIENT MESSAGE (OUTPATIENT)
Dept: ADMINISTRATIVE | Facility: HOSPITAL | Age: 85
End: 2020-10-05

## 2020-12-01 RX ORDER — METOPROLOL SUCCINATE 25 MG/1
TABLET, EXTENDED RELEASE ORAL
Qty: 45 TABLET | Refills: 0 | Status: SHIPPED | OUTPATIENT
Start: 2020-12-01 | End: 2020-12-17

## 2020-12-01 NOTE — TELEPHONE ENCOUNTER
Care Due:                  Date            Visit Type   Department     Provider  --------------------------------------------------------------------------------                                ESTABLISHED                              PATIENT      [facility]  Last Visit: 05-      [facility]       SUMAYA DOSHI  Next Visit: None Scheduled  None         None Found                                                            Last  Test          Frequency    Reason                     Performed    Due Date  --------------------------------------------------------------------------------    Office Visit  12 months..  levothyroxine, lisinopriL  05-   05-    CMP.........  12 months..  lisinopriL...............  04- 04-    TSH.........  12 months..  levothyroxine............  04- 04-    Powered by [employer]. Reference number: [id number]. 12/01/2020 8:02:44 AM   CST

## 2020-12-01 NOTE — PROGRESS NOTES
Refill Routing Note   Medication(s) are not appropriate for processing by [facility] for the following reason(s):     - Patient has not been seen in over 15 months by PCP  - Required vitals are abnormal  - Drug-Disease Interaction (Stenosis of femoral artery; Tibial artery occlusion, left AND Orthostatic hypotension)  [facility] action(s):  Defer     Will follow up with your staff to schedule appointment and labs after your decision.    Medication-related problems identified:   Requires labs  Requires appointment  Drug-disease interaction  Medication Therapy Plan: CDMR. NEEDS APPT(ANNUAL) TO RE-ESTABLISH CARE. LABS(CMP, LIPIDS, TSH) PER WOG; BP-ELEVATED(156/73) NOT TO [facility] STANDARDS ABNORMAL VITALS, PLEASE ADVISE  Medication reconciliation completed: No   Automatic Epic Generated Protocol Data:        Requested Prescriptions   Pending Prescriptions Disp Refills    metoprolol succinate (TOPROL-XL) 25 MG 24 hr tablet [Pharmacy Med Name: METOPROLOL SUCCINATE ER 25 25 Tablet] 45 tablet 0     Sig: TAKE 1/2 TABLET (12.5 MG TOTAL) BY MOUTH ONCE DAILY.-CUT IN 1/2 FOR PATIENT/PF       Cardiovascular:  Beta Blockers Failed - 12/1/2020  8:02 AM        Failed - Last BP in normal range within 360 days.     BP Readings from Last 3 Encounters:   06/03/20 (!) 156/73   02/10/20 138/70   05/20/19 130/60              Failed - Office visit in past 12 months or future 90 days     Recent Outpatient Visits            5 months ago Bilateral sensorineural hearing loss    [facility] Lizy Salcido NP    6 months ago Encounter for preventive health examination    [facility] Aleksandra Kessler NP    1 year ago Lumbar radiculopathy    [facility] Miguel Contreras MD    1 year ago Encounter for preventive health examination    [facility] Cece Barajas NP    2 years ago Cardiomyopathy, unspecified type    [facility] Patrice Aceves MD                     Passed - Patient is at least 18 years old        Passed - Last Heart Rate in normal range within 360 days.     Pulse Readings from Last 3 Encounters:   06/03/20 63   02/10/20 67   05/20/19 66                   Appointments  past 12m or future 3m with PCP    Date Provider   Last Visit   5/10/2019 Miguel Contreras MD   Next Visit   Visit date not found Miguel Contreras MD   ED visits in past 90 days: 0        Note composed:10:48 AM 12/01/2020

## 2020-12-03 RX ORDER — METOPROLOL SUCCINATE 25 MG/1
TABLET, EXTENDED RELEASE ORAL
Qty: 45 TABLET | Refills: 0 | OUTPATIENT
Start: 2020-12-03

## 2020-12-03 NOTE — PROGRESS NOTES
Peña ROWE. Duplicate Request    Refill Authorization Note   Toni Uriarte is requesting a refill authorization.    Brief assessment and rationale for refill: Quick Discontinue       Medication Therapy Plan: [facility]  Medication reconciliation completed: No    Comments:   Pended Medication(s)       Requested Prescriptions     Refused Prescriptions Disp Refills    metoprolol succinate (TOPROL-XL) 25 MG 24 hr tablet [Pharmacy Med Name: METOPROLOL SUCCINATE ER 25 25 Tablet] 45 tablet 0     Sig: TAKE 1/2 TABLET (12.5 MG TOTAL) BY MOUTH ONCE DAILY.-CUT IN 1/2 FOR PATIENT/PF     Refused By: GERALD DEL ROSARIO     Reason for Refusal: Request already responded to by other means (e.g. phone or fax)        Duplicate Pended Encounter(s)/ Last Prescribed Details:    Authorizing Provider: Miguel Conrteras MD CHRISTOS #:  [id number] NPI:  [id number]    Ordering User:  Miguel Contreras MD               Original Order:  metoprolol succinate (TOPROL-XL) 25 MG 24 hr tablet [[id number]]      Pharmacy:  [address]   CHRISTOS #:  --   Pharmacy Comments: --          Fill quantity remaining: -- Fill quantity used: -- Next fill due: --       Outpatient Medication Detail     Disp Refills Start End MIGUELINA   metoprolol succinate (TOPROL-XL) 25 MG 24 hr tablet 45 tablet 0 12/1/2020  No   Sig: TAKE 1/2 TABLET (12.5 MG TOTAL) BY MOUTH ONCE DAILY.-CUT IN 1/2 FOR PATIENT/PF   Sent to pharmacy as: metoprolol succinate (TOPROL-XL) 25 MG 24 hr tablet   Class: Normal   Notes to Pharmacy: Please delete all prior scripts with same name and strength including on holds.   Order: [id number]   Date/Time Signed: 12/1/2020 13:34       E-Prescribing Status: Receipt confirmed by pharmacy (12/1/2020  1:34 PM CST)   Ordering Encounter Report    Associated Reports   View Encounter                Note composed:2:50 PM 12/03/2020

## 2020-12-03 NOTE — TELEPHONE ENCOUNTER
No new care gaps identified.  Powered by [employer]. Reference number: [id number]. 12/03/2020 11:23:24 AM   CST

## 2020-12-10 ENCOUNTER — TELEPHONE (OUTPATIENT)
Dept: PODIATRY | Facility: CLINIC | Age: 85
End: 2020-12-10

## 2020-12-10 NOTE — TELEPHONE ENCOUNTER
----- Message from Shin Quiroz sent at 12/10/2020 10:22 AM CST -----  Type:  Same Day Appointment Request    Caller is requesting a same day appointment.  Caller declined first available appointment listed below.      Name of Caller:  Jennifer Edwards (Daughter)  When is the first available appointment?  01/12/21  Symptoms:  Nail care- injured toe while clipping nails and nail is about to fall off  Best Call Back Number: [phone]  Additional Information:

## 2020-12-11 ENCOUNTER — PATIENT MESSAGE (OUTPATIENT)
Dept: OTHER | Facility: OTHER | Age: 85
End: 2020-12-11

## 2020-12-16 NOTE — TELEPHONE ENCOUNTER
No new care gaps identified.  Powered by [employer]. Reference number: [id number]. 12/16/2020 10:16:28 AM   CST

## 2020-12-17 RX ORDER — METOPROLOL SUCCINATE 25 MG/1
TABLET, EXTENDED RELEASE ORAL
Qty: 45 TABLET | Refills: 0 | Status: SHIPPED | OUTPATIENT
Start: 2020-12-17 | End: 2021-06-24

## 2020-12-17 NOTE — PROGRESS NOTES
Refill Routing Note   Medication(s) are not appropriate for processing by [facility] for the following reason(s):     - Patient has not been seen in over 15 months by PCP  [facility] action(s):  Defer  Medication-related problems identified:   Requires labs  Requires appointment  Medication Therapy Plan: LOV with PCP 5/2019. CDML. appt(annual); labs(CMP, LIPIDS)   Will follow up with your staff to schedule appointment and labs after your decision.    Medication reconciliation completed: No   Automatic Epic Generated Protocol Data:        Requested Prescriptions   Pending Prescriptions Disp Refills    metoprolol succinate (TOPROL-XL) 25 MG 24 hr tablet [Pharmacy Med Name: METOPROLOL SUCCINATE ER 25 25 Tablet] 45 tablet 0     Sig: TAKE 1/2 TABLET (12.5 MG TOTAL) BY MOUTH ONCE DAILY.-CUT IN 1/2 FOR PATIENT/PF       Cardiovascular:  Beta Blockers Failed - 12/16/2020 10:16 AM        Failed - Last BP in normal range within 360 days.     BP Readings from Last 3 Encounters:   06/03/20 (!) 156/73   02/10/20 138/70   05/20/19 130/60              Failed - Office visit in past 12 months or future 90 days     Recent Outpatient Visits            6 months ago Bilateral sensorineural hearing loss    [facility] ENT Lizy Salcido NP    6 months ago Encounter for preventive health examination    [facility] Vis[facility] Aleksandra Kessler NP    1 year ago Lumbar radiculopathy    [facility] Miguel Contreras MD    1 year ago Encounter for preventive health examination    [facility] Medicine Cece Barajas NP    2 years ago Cardiomyopathy, unspecified type    [facility] Cardiology Patrice Aceves MD          Future Appointments              In 3 weeks Celso Quinteros DPM [facility]                Passed - Patient is at least 18 years old        Passed - Last Heart Rate in normal range within 360 days.     Pulse Readings from Last 3 Encounters:   06/03/20 63    02/10/20 67   05/20/19 66                   Appointments  past 12m or future 3m with PCP    Date Provider   Last Visit   5/10/2019 Miguel Contreras MD   Next Visit   Visit date not found Miguel Contreras MD   ED visits in past 90 days: 0        Note composed:11:20 AM 12/17/2020

## 2021-01-11 ENCOUNTER — PATIENT OUTREACH (OUTPATIENT)
Dept: ADMINISTRATIVE | Facility: OTHER | Age: 86
End: 2021-01-11

## 2021-01-12 ENCOUNTER — OFFICE VISIT (OUTPATIENT)
Dept: PODIATRY | Facility: CLINIC | Age: 86
End: 2021-01-12
Payer: MEDICARE

## 2021-01-12 VITALS — WEIGHT: 192.88 LBS | HEIGHT: 74 IN | BODY MASS INDEX: 24.75 KG/M2

## 2021-01-12 DIAGNOSIS — G60.9 IDIOPATHIC PERIPHERAL NEUROPATHY: Primary | ICD-10-CM

## 2021-01-12 DIAGNOSIS — B35.1 ONYCHOMYCOSIS DUE TO DERMATOPHYTE: ICD-10-CM

## 2021-01-12 PROCEDURE — 3288F PR FALLS RISK ASSESSMENT DOCUMENTED: ICD-10-PCS | Mod: HCNC,CPTII,S$GLB, | Performed by: PODIATRIST

## 2021-01-12 PROCEDURE — 99213 PR OFFICE/OUTPT VISIT, EST, LEVL III, 20-29 MIN: ICD-10-PCS | Mod: 25,HCNC,S$GLB, | Performed by: PODIATRIST

## 2021-01-12 PROCEDURE — 3288F FALL RISK ASSESSMENT DOCD: CPT | Mod: HCNC,CPTII,S$GLB, | Performed by: PODIATRIST

## 2021-01-12 PROCEDURE — 1126F PR PAIN SEVERITY QUANTIFIED, NO PAIN PRESENT: ICD-10-PCS | Mod: HCNC,S$GLB,, | Performed by: PODIATRIST

## 2021-01-12 PROCEDURE — 99499 UNLISTED E&M SERVICE: CPT | Mod: S$GLB,,, | Performed by: PODIATRIST

## 2021-01-12 PROCEDURE — 1159F MED LIST DOCD IN RCRD: CPT | Mod: HCNC,S$GLB,, | Performed by: PODIATRIST

## 2021-01-12 PROCEDURE — 99999 PR PBB SHADOW E&M-EST. PATIENT-LVL II: CPT | Mod: PBBFAC,HCNC,, | Performed by: PODIATRIST

## 2021-01-12 PROCEDURE — 1101F PT FALLS ASSESS-DOCD LE1/YR: CPT | Mod: HCNC,CPTII,S$GLB, | Performed by: PODIATRIST

## 2021-01-12 PROCEDURE — 1126F AMNT PAIN NOTED NONE PRSNT: CPT | Mod: HCNC,S$GLB,, | Performed by: PODIATRIST

## 2021-01-12 PROCEDURE — 1101F PR PT FALLS ASSESS DOC 0-1 FALLS W/OUT INJ PAST YR: ICD-10-PCS | Mod: HCNC,CPTII,S$GLB, | Performed by: PODIATRIST

## 2021-01-12 PROCEDURE — 99499 RISK ADDL DX/OHS AUDIT: ICD-10-PCS | Mod: S$GLB,,, | Performed by: PODIATRIST

## 2021-01-12 PROCEDURE — 11721 PR DEBRIDEMENT OF NAILS, 6 OR MORE: ICD-10-PCS | Mod: Q9,HCNC,S$GLB, | Performed by: PODIATRIST

## 2021-01-12 PROCEDURE — 1159F PR MEDICATION LIST DOCUMENTED IN MEDICAL RECORD: ICD-10-PCS | Mod: HCNC,S$GLB,, | Performed by: PODIATRIST

## 2021-01-12 PROCEDURE — 11721 DEBRIDE NAIL 6 OR MORE: CPT | Mod: Q9,HCNC,S$GLB, | Performed by: PODIATRIST

## 2021-01-12 PROCEDURE — 99999 PR PBB SHADOW E&M-EST. PATIENT-LVL II: ICD-10-PCS | Mod: PBBFAC,HCNC,, | Performed by: PODIATRIST

## 2021-01-12 PROCEDURE — 99213 OFFICE O/P EST LOW 20 MIN: CPT | Mod: 25,HCNC,S$GLB, | Performed by: PODIATRIST

## 2021-02-05 ENCOUNTER — PES CALL (OUTPATIENT)
Dept: ADMINISTRATIVE | Facility: CLINIC | Age: 86
End: 2021-02-05

## 2021-02-09 RX ORDER — LEVOTHYROXINE SODIUM 100 UG/1
100 TABLET ORAL DAILY
Qty: 90 TABLET | Refills: 0 | Status: SHIPPED | OUTPATIENT
Start: 2021-02-09 | End: 2021-06-02

## 2021-04-08 ENCOUNTER — TELEPHONE (OUTPATIENT)
Dept: PODIATRY | Facility: CLINIC | Age: 86
End: 2021-04-08

## 2021-05-28 DIAGNOSIS — E03.4 HYPOTHYROIDISM DUE TO ACQUIRED ATROPHY OF THYROID: Primary | ICD-10-CM

## 2021-06-08 RX ORDER — LEVOTHYROXINE SODIUM 100 UG/1
100 TABLET ORAL DAILY
Qty: 90 TABLET | Refills: 0 | Status: SHIPPED | OUTPATIENT
Start: 2021-06-08 | End: 2021-10-04

## 2021-06-29 RX ORDER — METOPROLOL SUCCINATE 25 MG/1
TABLET, EXTENDED RELEASE ORAL
Qty: 45 TABLET | Refills: 0 | Status: SHIPPED | OUTPATIENT
Start: 2021-06-29 | End: 2023-11-05 | Stop reason: ALTCHOICE

## 2021-07-07 ENCOUNTER — PATIENT MESSAGE (OUTPATIENT)
Dept: ADMINISTRATIVE | Facility: HOSPITAL | Age: 86
End: 2021-07-07

## 2021-09-24 DIAGNOSIS — E03.4 HYPOTHYROIDISM DUE TO ACQUIRED ATROPHY OF THYROID: ICD-10-CM

## 2021-10-04 RX ORDER — LEVOTHYROXINE SODIUM 100 UG/1
100 TABLET ORAL DAILY
Qty: 30 TABLET | Refills: 3 | Status: ON HOLD | OUTPATIENT
Start: 2021-10-04 | End: 2023-11-28 | Stop reason: SDUPTHER

## 2021-11-09 ENCOUNTER — CLINICAL SUPPORT (OUTPATIENT)
Dept: AUDIOLOGY | Facility: CLINIC | Age: 86
End: 2021-11-09

## 2021-11-09 PROCEDURE — V5266 BATTERY FOR HEARING DEVICE: HCPCS | Mod: CSM,S$GLB,, | Performed by: AUDIOLOGIST

## 2021-11-09 PROCEDURE — V5266 PR BATTERY FOR HEARING DEVICE: ICD-10-PCS | Mod: CSM,S$GLB,, | Performed by: AUDIOLOGIST

## 2021-11-09 PROCEDURE — COVTAX CONVINGTON NON-PRESCRIBED 8.70%: ICD-10-PCS | Mod: CSM,S$GLB,, | Performed by: AUDIOLOGIST

## 2021-11-09 PROCEDURE — COVTAX CONVINGTON NON-PRESCRIBED 8.70%: Mod: CSM,S$GLB,, | Performed by: AUDIOLOGIST

## 2021-12-07 ENCOUNTER — OFFICE VISIT (OUTPATIENT)
Dept: CARDIOLOGY | Facility: CLINIC | Age: 86
End: 2021-12-07
Payer: MEDICARE

## 2021-12-07 VITALS
HEIGHT: 74 IN | DIASTOLIC BLOOD PRESSURE: 66 MMHG | WEIGHT: 192.88 LBS | HEART RATE: 66 BPM | BODY MASS INDEX: 24.75 KG/M2 | SYSTOLIC BLOOD PRESSURE: 141 MMHG

## 2021-12-07 DIAGNOSIS — N18.30 STAGE 3 CHRONIC KIDNEY DISEASE, UNSPECIFIED WHETHER STAGE 3A OR 3B CKD: ICD-10-CM

## 2021-12-07 DIAGNOSIS — I70.0 AORTIC ATHEROSCLEROSIS: ICD-10-CM

## 2021-12-07 DIAGNOSIS — I65.23 BILATERAL CAROTID ARTERY STENOSIS: ICD-10-CM

## 2021-12-07 DIAGNOSIS — I10 ESSENTIAL HYPERTENSION: Primary | ICD-10-CM

## 2021-12-07 PROCEDURE — 99214 PR OFFICE/OUTPT VISIT, EST, LEVL IV, 30-39 MIN: ICD-10-PCS | Mod: HCNC,S$GLB,, | Performed by: INTERNAL MEDICINE

## 2021-12-07 PROCEDURE — 99499 RISK ADDL DX/OHS AUDIT: ICD-10-PCS | Mod: S$GLB,,, | Performed by: INTERNAL MEDICINE

## 2021-12-07 PROCEDURE — 99499 UNLISTED E&M SERVICE: CPT | Mod: S$GLB,,, | Performed by: INTERNAL MEDICINE

## 2021-12-07 PROCEDURE — 99999 PR PBB SHADOW E&M-EST. PATIENT-LVL III: CPT | Mod: PBBFAC,HCNC,, | Performed by: INTERNAL MEDICINE

## 2021-12-07 PROCEDURE — 99214 OFFICE O/P EST MOD 30 MIN: CPT | Mod: HCNC,S$GLB,, | Performed by: INTERNAL MEDICINE

## 2021-12-07 PROCEDURE — 99999 PR PBB SHADOW E&M-EST. PATIENT-LVL III: ICD-10-PCS | Mod: PBBFAC,HCNC,, | Performed by: INTERNAL MEDICINE

## 2021-12-07 RX ORDER — ACETAMINOPHEN 500 MG
1 TABLET ORAL DAILY
COMMUNITY

## 2021-12-07 RX ORDER — LANOLIN ALCOHOL/MO/W.PET/CERES
100 CREAM (GRAM) TOPICAL DAILY
COMMUNITY

## 2021-12-07 RX ORDER — BUPROPION HYDROCHLORIDE 150 MG/1
150 TABLET, EXTENDED RELEASE ORAL DAILY
COMMUNITY
Start: 2021-10-26

## 2022-05-09 ENCOUNTER — TELEPHONE (OUTPATIENT)
Dept: DERMATOLOGY | Facility: CLINIC | Age: 87
End: 2022-05-09
Payer: MEDICARE

## 2022-05-09 NOTE — TELEPHONE ENCOUNTER
Pt canceled apt for 5/30/22 due to wanting to be seen outside of [facility] on a Tuesday or Thursday only.

## 2022-05-09 NOTE — TELEPHONE ENCOUNTER
----- Message from Paola Henderson sent at 5/9/2022  4:32 PM CDT -----  Contact: Self  Patient has an appointment to see Dr. Casas on 05/30, but he has conflicting appts already scheduled that day and needs to reschedule this NP appointment to either a Tuesday or Thursday as soon as possible. He is willing to go into June if necessary. Please call pt back to reschedule this appt at [phone] (work). Thank You.

## 2022-05-09 NOTE — TELEPHONE ENCOUNTER
----- Message from Ignacio Olivares sent at 5/9/2022 11:13 AM CDT -----  Contact: Self  Type:  Sooner Appointment Request    Caller is requesting a sooner appointment.  Caller declined first available appointment listed below.  Caller will not accept being placed on the waitlist and is requesting a message be sent to doctor.    Name of Caller:  Patient  When is the first available appointment?  N/a  Symptoms:  Moles, states they are painful  Best Call Back Number:  [phone]   Additional Information:

## 2022-11-03 NOTE — TELEPHONE ENCOUNTER
----- Message from Shaye Aparicio sent at 4/27/2018 10:41 AM CDT -----  Contact: Daughter  Shea, daughter [phone] calling to see why patient was sent to the hospital this morning. Transferred to office. Thanks!   Negative

## 2022-12-16 ENCOUNTER — TELEPHONE (OUTPATIENT)
Dept: CARDIOLOGY | Facility: CLINIC | Age: 87
End: 2022-12-16
Payer: MEDICARE

## 2022-12-16 NOTE — TELEPHONE ENCOUNTER
----- Message from [facility] sent at 12/16/2022 10:23 AM CST -----  Contact: self  Type:  Sooner Appointment Request    Caller is requesting a sooner appointment.  Caller declined first available appointment listed below.  Caller will not accept being placed on the waitlist and is requesting a message be sent to doctor.    Name of Caller:  patient  When is the first available appointment?  march  Symptoms:  slight chest discomfort and past due for annual  Best Call Back Number:  [phone]  Additional Information:  He talked to a nurse and was told to get in to see the doctor.

## 2023-01-05 ENCOUNTER — TELEPHONE (OUTPATIENT)
Dept: AUDIOLOGY | Facility: CLINIC | Age: 88
End: 2023-01-05
Payer: MEDICARE

## 2023-01-05 NOTE — TELEPHONE ENCOUNTER
S/w pt and he purchased HAs in 2015 and now he can't wear them. Pt is requesting an appt to discuss all the new types of HAs available to him. He did purchase them from [facility]. Please call pt back at [phone].

## 2023-01-05 NOTE — TELEPHONE ENCOUNTER
----- Message from Corbin Morales sent at 1/5/2023  9:38 AM CST -----  Contact: pt  Type: Needs Medical Advice  Who Called:  pt     Best Call Back Number: [phone]    Additional Information: pt would like to speak with someone in staff. Please advise.

## 2023-01-06 NOTE — TELEPHONE ENCOUNTER
Returned call to patient to discuss hearing aids. He reported that during the pandemic he stopped wearing his hearing aids and that he is interested in getting new aids. He reported that he will check with his [employer]  to determine what he would need to do in order to use his hearing aid benefit. The patient will contact us as needed.

## 2023-01-09 ENCOUNTER — OFFICE VISIT (OUTPATIENT)
Dept: CARDIOLOGY | Facility: CLINIC | Age: 88
End: 2023-01-09
Payer: MEDICARE

## 2023-01-09 VITALS
DIASTOLIC BLOOD PRESSURE: 73 MMHG | BODY MASS INDEX: 25.27 KG/M2 | HEART RATE: 58 BPM | SYSTOLIC BLOOD PRESSURE: 182 MMHG | WEIGHT: 196.88 LBS | HEIGHT: 74 IN

## 2023-01-09 DIAGNOSIS — I50.32 DIASTOLIC DYSFUNCTION WITH CHRONIC HEART FAILURE: ICD-10-CM

## 2023-01-09 DIAGNOSIS — I42.9 CARDIOMYOPATHY, UNSPECIFIED TYPE: Primary | ICD-10-CM

## 2023-01-09 DIAGNOSIS — I73.9 PAD (PERIPHERAL ARTERY DISEASE): ICD-10-CM

## 2023-01-09 DIAGNOSIS — E78.2 MIXED HYPERLIPIDEMIA: ICD-10-CM

## 2023-01-09 DIAGNOSIS — I10 ESSENTIAL HYPERTENSION: ICD-10-CM

## 2023-01-09 PROCEDURE — 1159F PR MEDICATION LIST DOCUMENTED IN MEDICAL RECORD: ICD-10-PCS | Mod: HCNC,CPTII,S$GLB, | Performed by: INTERNAL MEDICINE

## 2023-01-09 PROCEDURE — 1160F PR REVIEW ALL MEDS BY PRESCRIBER/CLIN PHARMACIST DOCUMENTED: ICD-10-PCS | Mod: HCNC,CPTII,S$GLB, | Performed by: INTERNAL MEDICINE

## 2023-01-09 PROCEDURE — 1101F PT FALLS ASSESS-DOCD LE1/YR: CPT | Mod: HCNC,CPTII,S$GLB, | Performed by: INTERNAL MEDICINE

## 2023-01-09 PROCEDURE — 99214 OFFICE O/P EST MOD 30 MIN: CPT | Mod: HCNC,S$GLB,, | Performed by: INTERNAL MEDICINE

## 2023-01-09 PROCEDURE — 99214 PR OFFICE/OUTPT VISIT, EST, LEVL IV, 30-39 MIN: ICD-10-PCS | Mod: HCNC,S$GLB,, | Performed by: INTERNAL MEDICINE

## 2023-01-09 PROCEDURE — 1101F PR PT FALLS ASSESS DOC 0-1 FALLS W/OUT INJ PAST YR: ICD-10-PCS | Mod: HCNC,CPTII,S$GLB, | Performed by: INTERNAL MEDICINE

## 2023-01-09 PROCEDURE — 1126F PR PAIN SEVERITY QUANTIFIED, NO PAIN PRESENT: ICD-10-PCS | Mod: HCNC,CPTII,S$GLB, | Performed by: INTERNAL MEDICINE

## 2023-01-09 PROCEDURE — 1159F MED LIST DOCD IN RCRD: CPT | Mod: HCNC,CPTII,S$GLB, | Performed by: INTERNAL MEDICINE

## 2023-01-09 PROCEDURE — 1160F RVW MEDS BY RX/DR IN RCRD: CPT | Mod: HCNC,CPTII,S$GLB, | Performed by: INTERNAL MEDICINE

## 2023-01-09 PROCEDURE — 99999 PR PBB SHADOW E&M-EST. PATIENT-LVL III: ICD-10-PCS | Mod: PBBFAC,HCNC,, | Performed by: INTERNAL MEDICINE

## 2023-01-09 PROCEDURE — 1126F AMNT PAIN NOTED NONE PRSNT: CPT | Mod: HCNC,CPTII,S$GLB, | Performed by: INTERNAL MEDICINE

## 2023-01-09 PROCEDURE — 3288F FALL RISK ASSESSMENT DOCD: CPT | Mod: HCNC,CPTII,S$GLB, | Performed by: INTERNAL MEDICINE

## 2023-01-09 PROCEDURE — 99999 PR PBB SHADOW E&M-EST. PATIENT-LVL III: CPT | Mod: PBBFAC,HCNC,, | Performed by: INTERNAL MEDICINE

## 2023-01-09 PROCEDURE — 3288F PR FALLS RISK ASSESSMENT DOCUMENTED: ICD-10-PCS | Mod: HCNC,CPTII,S$GLB, | Performed by: INTERNAL MEDICINE

## 2023-01-09 NOTE — PROGRESS NOTES
Subjective:    Patient ID:  Toni Uriarte is a 89 y.o. male who presents for follow-up of Hypertension      HPI  He comes for follow up with no major problems, no chest pain, no shortness of breath.  Occasional chest pain and tightness, lasting seconds, not related to any type of activity.  No syncope.    Review of Systems   Constitutional: Negative for decreased appetite, malaise/fatigue, weight gain and weight loss.   Cardiovascular:  Negative for chest pain, dyspnea on exertion, leg swelling, palpitations and syncope.   Respiratory:  Negative for cough and shortness of breath.    Gastrointestinal: Negative.    Neurological:  Negative for weakness.   All other systems reviewed and are negative.     Objective:      Physical Exam  Vitals and nursing note reviewed.   Constitutional:       Appearance: Normal appearance. He is well-developed.   HENT:      Head: Normocephalic.   Eyes:      Pupils: Pupils are equal, round, and reactive to light.   Neck:      Thyroid: No thyromegaly.      Vascular: No carotid bruit or JVD.   Cardiovascular:      Rate and Rhythm: Normal rate and regular rhythm.      Chest Wall: PMI is not displaced.      Pulses: Normal pulses and intact distal pulses.      Heart sounds: Normal heart sounds. No murmur heard.    No gallop.   Pulmonary:      Effort: Pulmonary effort is normal.      Breath sounds: Normal breath sounds.   Abdominal:      Palpations: Abdomen is soft. There is no mass.      Tenderness: There is no abdominal tenderness.   Musculoskeletal:         General: Normal range of motion.      Cervical back: Normal range of motion and neck supple.   Skin:     General: Skin is warm.   Neurological:      Mental Status: He is alert and oriented to person, place, and time.      Sensory: No sensory deficit.      Deep Tendon Reflexes: Reflexes are normal and symmetric.       Assessment:       1. Cardiomyopathy, unspecified type    2. Diastolic dysfunction with chronic heart failure    3.  Mixed hyperlipidemia    4. Essential hypertension    5. PAD (peripheral artery disease)         Plan:   Echocardiogram, call with results  Continue all cardiac medications  Regular exercise program  Weight loss  1 year follow-up if echocardiogram unchanged

## 2023-01-12 ENCOUNTER — CLINICAL SUPPORT (OUTPATIENT)
Dept: CARDIOLOGY | Facility: HOSPITAL | Age: 88
End: 2023-01-12
Attending: INTERNAL MEDICINE
Payer: MEDICARE

## 2023-01-12 VITALS — BODY MASS INDEX: 25.15 KG/M2 | HEIGHT: 74 IN | WEIGHT: 196 LBS

## 2023-01-12 DIAGNOSIS — I42.9 CARDIOMYOPATHY, UNSPECIFIED TYPE: ICD-10-CM

## 2023-01-12 DIAGNOSIS — I10 ESSENTIAL HYPERTENSION: ICD-10-CM

## 2023-01-12 DIAGNOSIS — I73.9 PAD (PERIPHERAL ARTERY DISEASE): ICD-10-CM

## 2023-01-12 DIAGNOSIS — E78.2 MIXED HYPERLIPIDEMIA: ICD-10-CM

## 2023-01-12 DIAGNOSIS — I50.32 DIASTOLIC DYSFUNCTION WITH CHRONIC HEART FAILURE: ICD-10-CM

## 2023-01-12 LAB
AV INDEX (PROSTH): 0.7
AV MEAN GRADIENT: 4 MMHG
AV PEAK GRADIENT: 7 MMHG
AV REGURGITATION PRESSURE HALF TIME: 522.3 MS
AV VALVE AREA: 2.53 CM2
AV VELOCITY RATIO: 0.65
BSA FOR ECHO PROCEDURE: 2.15 M2
CV ECHO LV RWT: 0.43 CM
DOP CALC AO PEAK VEL: 1.36 M/S
DOP CALC AO VTI: 36.7 CM
DOP CALC LVOT AREA: 3.6 CM2
DOP CALC LVOT DIAMETER: 2.15 CM
DOP CALC LVOT PEAK VEL: 0.89 M/S
DOP CALC LVOT STROKE VOLUME: 92.89 CM3
DOP CALCLVOT PEAK VEL VTI: 25.6 CM
E WAVE DECELERATION TIME: 373.97 MSEC
E/A RATIO: 0.6
E/E' RATIO: 13.4 M/S
ECHO LV POSTERIOR WALL: 1.11 CM (ref 0.6–1.1)
EJECTION FRACTION: 45 %
FRACTIONAL SHORTENING: 20 % (ref 28–44)
INTERVENTRICULAR SEPTUM: 1.11 CM (ref 0.6–1.1)
IVRT: 148.43 MSEC
LA MAJOR: 5.47 CM
LA MINOR: 5.84 CM
LA WIDTH: 4.7 CM
LEFT ATRIUM SIZE: 4.7 CM
LEFT ATRIUM VOLUME INDEX: 49.3 ML/M2
LEFT ATRIUM VOLUME: 106.07 CM3
LEFT INTERNAL DIMENSION IN SYSTOLE: 4.13 CM (ref 2.1–4)
LEFT VENTRICLE DIASTOLIC VOLUME INDEX: 59.9 ML/M2
LEFT VENTRICLE DIASTOLIC VOLUME: 128.79 ML
LEFT VENTRICLE MASS INDEX: 104 G/M2
LEFT VENTRICLE SYSTOLIC VOLUME INDEX: 35.1 ML/M2
LEFT VENTRICLE SYSTOLIC VOLUME: 75.48 ML
LEFT VENTRICULAR INTERNAL DIMENSION IN DIASTOLE: 5.19 CM (ref 3.5–6)
LEFT VENTRICULAR MASS: 222.8 G
LV LATERAL E/E' RATIO: 13.4 M/S
LV SEPTAL E/E' RATIO: 13.4 M/S
LVOT MG: 1.79 MMHG
LVOT MV: 0.62 CM/S
MV PEAK A VEL: 1.11 M/S
MV PEAK E VEL: 0.67 M/S
MV STENOSIS PRESSURE HALF TIME: 108.45 MS
MV VALVE AREA P 1/2 METHOD: 2.03 CM2
PISA AR MAX VEL: 4.37 M/S
PISA MRMAX VEL: 5.46 M/S
PISA TR MAX VEL: 2.61 M/S
PULM VEIN S/D RATIO: 2.13
PV PEAK D VEL: 0.24 M/S
PV PEAK S VEL: 0.51 M/S
RA MAJOR: 4.73 CM
RA WIDTH: 4 CM
RIGHT VENTRICULAR END-DIASTOLIC DIMENSION: 4.66 CM
RIGHT VENTRICULAR LENGTH IN DIASTOLE (APICAL 4-CHAMBER VIEW): 7.2 CM
RV MID DIAMA: 31.17 CM
RV TISSUE DOPPLER FREE WALL SYSTOLIC VELOCITY 1 (APICAL 4 CHAMBER VIEW): 0.02 CM/S
SINUS: 3.99 CM
STJ: 3.75 CM
TDI LATERAL: 0.05 M/S
TDI SEPTAL: 0.05 M/S
TDI: 0.05 M/S
TR MAX PG: 27 MMHG
TRICUSPID ANNULAR PLANE SYSTOLIC EXCURSION: 3.21 CM

## 2023-01-12 PROCEDURE — 93306 ECHO (CUPID ONLY): ICD-10-PCS | Mod: 26,HCNC,, | Performed by: INTERNAL MEDICINE

## 2023-01-12 PROCEDURE — 93306 TTE W/DOPPLER COMPLETE: CPT | Mod: 26,HCNC,, | Performed by: INTERNAL MEDICINE

## 2023-01-12 PROCEDURE — 93306 TTE W/DOPPLER COMPLETE: CPT | Mod: HCNC,PO

## 2023-02-01 ENCOUNTER — TELEPHONE (OUTPATIENT)
Dept: AUDIOLOGY | Facility: CLINIC | Age: 88
End: 2023-02-01
Payer: MEDICARE

## 2023-02-01 NOTE — TELEPHONE ENCOUNTER
Spoke with daughter and got patient scheduled for an updated audiogram and hearing aid consultation.

## 2023-02-01 NOTE — TELEPHONE ENCOUNTER
----- Message from Makayla Marquez sent at 2/1/2023 12:04 PM CST -----  Contact: [phone]  Type: Needs Medical Advice  Who Called:  Pt     Best Call Back Number: [phone] (home) [phone] (work)    Additional Information: Pt is calling to schedule an appt to have his hearing aids check and possibly upgraded. Pls call back after 3pm.

## 2023-02-09 DIAGNOSIS — Z00.00 ENCOUNTER FOR MEDICARE ANNUAL WELLNESS EXAM: ICD-10-CM

## 2023-09-07 ENCOUNTER — PATIENT OUTREACH (OUTPATIENT)
Dept: ADMINISTRATIVE | Facility: HOSPITAL | Age: 88
End: 2023-09-07
Payer: MEDICARE

## 2023-09-07 ENCOUNTER — PATIENT MESSAGE (OUTPATIENT)
Dept: ADMINISTRATIVE | Facility: HOSPITAL | Age: 88
End: 2023-09-07
Payer: MEDICARE

## 2023-09-07 NOTE — PROGRESS NOTES
Population Health Chart Review & Patient Outreach Details:     Reason for Outreach Encounter:     [x]  Non-Compliant Report   []  Payor Report ([employer], PHN, BCBS, MSSP, MCIP, UHC, etc.)   []  Pre-Visit Chart Review     Updates Requested / Reviewed:     [x]  Care Everywhere    []     []  External Sources ([facility], [facility], DIS, etc.)   [x]  Care Team Updated    Patient Outreach Method:    [x]  Telephone Outreach Completed   [] Successful   [] Left Voicemail   [x] Unable to Contact (wrong number, no voicemail)  [x]  [facility] Portal Outreach Sent  []  Letter Outreach Mailed  []  Fax Sent for External Records  []  External Records Upload    Health Maintenance Topics Addressed and Outreach Outcomes / Actions Taken:        []      Breast Cancer Screening []  Mammo Scheduled      []  External Records Requested     []  Added Reminder to Complete to Upcoming Primary Care Appt Notes     []  Patient Declined     []  Patient Will Call Back to Schedule     []  Patient Will Schedule with External Provider / Order Routed if Applicable             []       Cervical Cancer Screening []  Pap Scheduled      []  External Records Requested     []  Added Reminder to Complete to Upcoming Primary Care Appt Notes     []  Patient Declined     []  Patient Will Call Back to Schedule     []  Patient Will Schedule with External Provider               []          Colorectal Cancer Screening []  Colonoscopy Case Request or Referral Placed     []  External Records Requested     []  Added Reminder to Complete to Upcoming Primary Care Appt Notes     []  Patient Declined     []  Patient Will Call Back to Schedule     []  Patient Will Schedule with External Provider     []  Fit Kit Mailed (add the SmartPhrase under additional notes)     []  Reminded Patient to Complete Home Test             []      Diabetic Eye Exam []  Eye Camera Scheduled or Optometry Referral Placed     []  External Records Requested     []  Added Reminder to Complete to  Upcoming Primary Care Appt Notes     []  Patient Declined     []  Patient Will Call Back to Schedule     []  Patient Will Schedule with External Provider             []      Blood Pressure Control []  Primary Care Follow Up Visit Scheduled     []  Remote Blood Pressure Reading Captured     []  Added Reminder to Complete to Upcoming Primary Care Appt Notes     []  Patient Declined     []  Patient Will Call Back / Patient Will Send Portal Message with Reading     []  Patient Will Call Back to Schedule Provider Visit             []       HbA1c & Other Labs []  Lab Appt Scheduled for Due Labs     []  Primary Care Follow Up Visit Scheduled      []  Reminded Patient to Complete Home Test     []  Added Reminder to Complete to Upcoming Primary Care Appt Notes     []  Patient Declined     []  Patient Will Call Back to Schedule     []  Patient Will Schedule with External Provider / Order Routed if Applicable           [x]    Schedule Primary Care Appt []  Primary Care Appt Scheduled     []  Patient Declined     []  Patient Will Call Back to Schedule     []  Pt Established with External Provider & Updated Care Team             []      Medication Adherence []  Primary Care Appointment Scheduled     []  Added Reminder to Upcoming Primary Care Appt Notes     []  Patient Reminded to  Prescription     []  Patient Declined, Provider Notified if Needed     []  Sent Provider Message to Review and/or Add Exclusion to Problem List             []      Osteoporosis Screening []  DXA Appointment Scheduled     []  External Records Requested     []  Added Reminder to Complete to Upcoming Primary Care Appt Notes     []  Patient Declined     []  Patient Will Call Back to Schedule     []  Patient Will Schedule with External Provider / Order Routed if Applicable     Additional Care Coordinator Notes:         Further Action Needed If Patient Returns Outreach:

## 2023-09-11 ENCOUNTER — PATIENT OUTREACH (OUTPATIENT)
Dept: ADMINISTRATIVE | Facility: HOSPITAL | Age: 88
End: 2023-09-11
Payer: MEDICARE

## 2023-09-11 NOTE — PROGRESS NOTES
Per patient's daughter via patient portal:      Donalarcadio,  Dad is seeing Dr Salamanca at [facility]. It is a clinic at [facility]. He really does not need a PCP at [facility].   Francine Edwards  AOR and daughter  [phone]    Care team updated.

## 2023-10-27 ENCOUNTER — OFFICE VISIT (OUTPATIENT)
Dept: PHYSICAL MEDICINE AND REHAB | Facility: CLINIC | Age: 88
End: 2023-10-27
Payer: MEDICARE

## 2023-10-27 VITALS
HEART RATE: 81 BPM | DIASTOLIC BLOOD PRESSURE: 50 MMHG | HEIGHT: 74 IN | SYSTOLIC BLOOD PRESSURE: 107 MMHG | BODY MASS INDEX: 25.29 KG/M2 | WEIGHT: 197.06 LBS

## 2023-10-27 DIAGNOSIS — G57.13 MERALGIA PARESTHETICA OF BOTH LOWER EXTREMITIES: Primary | ICD-10-CM

## 2023-10-27 DIAGNOSIS — G62.9 PERIPHERAL POLYNEUROPATHY: ICD-10-CM

## 2023-10-27 DIAGNOSIS — Z78.9 DECREASED ACTIVITIES OF DAILY LIVING (ADL): ICD-10-CM

## 2023-10-27 PROCEDURE — 1125F AMNT PAIN NOTED PAIN PRSNT: CPT | Mod: HCNC,CPTII,S$GLB, | Performed by: STUDENT IN AN ORGANIZED HEALTH CARE EDUCATION/TRAINING PROGRAM

## 2023-10-27 PROCEDURE — 99999 PR PBB SHADOW E&M-EST. PATIENT-LVL IV: ICD-10-PCS | Mod: PBBFAC,HCNC,, | Performed by: STUDENT IN AN ORGANIZED HEALTH CARE EDUCATION/TRAINING PROGRAM

## 2023-10-27 PROCEDURE — 99204 OFFICE O/P NEW MOD 45 MIN: CPT | Mod: HCNC,S$GLB,, | Performed by: STUDENT IN AN ORGANIZED HEALTH CARE EDUCATION/TRAINING PROGRAM

## 2023-10-27 PROCEDURE — 3288F PR FALLS RISK ASSESSMENT DOCUMENTED: ICD-10-PCS | Mod: HCNC,CPTII,S$GLB, | Performed by: STUDENT IN AN ORGANIZED HEALTH CARE EDUCATION/TRAINING PROGRAM

## 2023-10-27 PROCEDURE — 99204 PR OFFICE/OUTPT VISIT, NEW, LEVL IV, 45-59 MIN: ICD-10-PCS | Mod: HCNC,S$GLB,, | Performed by: STUDENT IN AN ORGANIZED HEALTH CARE EDUCATION/TRAINING PROGRAM

## 2023-10-27 PROCEDURE — 1159F PR MEDICATION LIST DOCUMENTED IN MEDICAL RECORD: ICD-10-PCS | Mod: HCNC,CPTII,S$GLB, | Performed by: STUDENT IN AN ORGANIZED HEALTH CARE EDUCATION/TRAINING PROGRAM

## 2023-10-27 PROCEDURE — 1125F PR PAIN SEVERITY QUANTIFIED, PAIN PRESENT: ICD-10-PCS | Mod: HCNC,CPTII,S$GLB, | Performed by: STUDENT IN AN ORGANIZED HEALTH CARE EDUCATION/TRAINING PROGRAM

## 2023-10-27 PROCEDURE — 1100F PTFALLS ASSESS-DOCD GE2>/YR: CPT | Mod: HCNC,CPTII,S$GLB, | Performed by: STUDENT IN AN ORGANIZED HEALTH CARE EDUCATION/TRAINING PROGRAM

## 2023-10-27 PROCEDURE — 3288F FALL RISK ASSESSMENT DOCD: CPT | Mod: HCNC,CPTII,S$GLB, | Performed by: STUDENT IN AN ORGANIZED HEALTH CARE EDUCATION/TRAINING PROGRAM

## 2023-10-27 PROCEDURE — 1159F MED LIST DOCD IN RCRD: CPT | Mod: HCNC,CPTII,S$GLB, | Performed by: STUDENT IN AN ORGANIZED HEALTH CARE EDUCATION/TRAINING PROGRAM

## 2023-10-27 PROCEDURE — 99999 PR PBB SHADOW E&M-EST. PATIENT-LVL IV: CPT | Mod: PBBFAC,HCNC,, | Performed by: STUDENT IN AN ORGANIZED HEALTH CARE EDUCATION/TRAINING PROGRAM

## 2023-10-27 PROCEDURE — 1100F PR PT FALLS ASSESS DOC 2+ FALLS/FALL W/INJURY/YR: ICD-10-PCS | Mod: HCNC,CPTII,S$GLB, | Performed by: STUDENT IN AN ORGANIZED HEALTH CARE EDUCATION/TRAINING PROGRAM

## 2023-10-27 RX ORDER — PREGABALIN 50 MG/1
50 CAPSULE ORAL 2 TIMES DAILY
Qty: 60 CAPSULE | Refills: 2 | Status: ON HOLD | OUTPATIENT
Start: 2023-10-27 | End: 2023-11-28 | Stop reason: HOSPADM

## 2023-10-27 NOTE — ASSESSMENT & PLAN NOTE
Physical therapy evaluate and treat   Continue home exercise program with video instruction as well as group therapy at [facility].

## 2023-10-27 NOTE — ASSESSMENT & PLAN NOTE
Swab gabapentin 300 b.i.d. for Lyrica 50 mg b.i.d..    Lidocaine patch to both lateral thighs  Physical therapy for stretching for meralgia paresthetica to be added to his current ongoing therapy.    Count counseled on modifications including weight loss, avoiding tight fitting clothing or tight-fitting belts.    Return to clinic in 2 months.

## 2023-10-27 NOTE — PROGRESS NOTES
PHYSICAL MEDICINE AND REHABILITATION  New Patient Consult:    Subjective:   Chief Complaint:    Chief Complaint   Patient presents with    Other Misc     Neuropathy in both legs and feet       HPI: Toni Uriarte is a 89 y.o. 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 She was sent to me for consultation for Other Misc (Neuropathy in both legs and feet)  Today,  he complains of neuropathy in his thighs to his feet bilaterally.  He describes the pain as tingling, throbbing, numb.  Pain is only when he is standing up and moving about.  Pain worsens with standing, walking and lifting.  He has tried gabapentin, exercises and soaking his feet in water without improvement.  He also complains of tingling, fatigue, weakness, balance/gait issues and numbness.  Pulse being on gabapentin 300 mg b.i.d..  He has noted some sedation with gabapentin.  He is accompanied by an assistant who recalls having this titrated up and down in the past.  He has started physical therapy for gait training and participates in group therapy 3 days per week at [facility].  They are requesting an increased frequency in therapy as well.  He recalls participating in video instructed therapy in his room at times.  He has the diagnosis of meralgia paresthetica of the right lower extremity and the symptoms on the left are similar.    Review of Systems  Constipation, joint stiffness/swelling and bladder weakness.    Imaging/Diagnostic Studies  Lumbar spine AP and lateral      Surgical clips are noted in the  right upper quadrant of abdomen.  Facet arthropathy is noted at the L4-L5 and L5-S1 levels.       Anterior osseous spurring is noted at multiple upper lumbar levels as can be seen with ankylosing spondylitis or diffuse idiopathic skeletal hyperostosis.       A minimal retrolisthesis is noted of the L4 and L5 vertebral body of 4 mm.  Atherosclerotic changes are noted within the aorta  IMPRESSION:    See above   ________________________      Electronically signed by: Heriberto Hernandez MD  Date:                                            01/12/16      Past Medical History:   Diagnosis Date    Acute combined systolic (congestive) and diastolic (congestive) heart failure     Arthritis     Basal cell carcinoma     L helix 8-2015    BPH (benign prostatic hypertrophy)     Clostridium difficile infection 4/12/2019    Diagnosed on 3/28/19 - on vanco x 10 days however, patient was not compliant    Depression 2/19/2018    Disorder of kidney and ureter     CKD 3    GERD (gastroesophageal reflux disease)     Hyperlipidemia     Hypertension     Hypothyroid     VIKTORIA (obstructive sleep apnea)     Positive RPR test 4/12/2019    Snoring     Squamous cell carcinoma     left ear s/p excision    Urinary incontinence     occasional leakage of urine       Past Surgical History:   Procedure Laterality Date    CATARACT EXTRACTION Bilateral     CHOLECYSTECTOMY      CIRCUMCISION      EYE SURGERY      GALLBLADDER SURGERY  2007    HEMORRHOID SURGERY      PROSTATE SURGERY      TONSILLECTOMY         Review of patient's allergies indicates:   Allergen Reactions    Demerol [meperidine] Other (See Comments)     arrest    Citalopram analogues      dizziness       Current Outpatient Medications   Medication Sig Dispense Refill    acetaminophen (TYLENOL) 325 MG tablet Take 2 tablets (650 mg total) by mouth every 4 (four) hours as needed.  0    buPROPion (WELLBUTRIN SR) 150 MG TBSR 12 hr tablet       cholecalciferol, vitamin D3, (VITAMIN D3) 50 mcg (2,000  unit) Cap capsule Take 1 capsule by mouth once daily.      cyanocobalamin (VITAMIN B-12) 1000 MCG tablet Take 100 mcg by mouth once daily.      levothyroxine (SYNTHROID) 100 MCG tablet TAKE 1 TABLET (100 MCG TOTAL) BY MOUTH ONCE DAILY. 30 tablet 3    melatonin 5 mg Cap Take by mouth every evening.       omeprazole (PRILOSEC) 20 MG capsule   2    tamsulosin (FLOMAX) 0.4 mg Cap TAKE 2 CAPSULES BY MOUTH AT BEDTIME 60 capsule 2    diphenoxylate-atropine 2.5-0.025 mg (LOMOTIL) 2.5-0.025 mg per tablet Take 1 tablet by mouth 4 (four) times daily as needed for Diarrhea.      docusate sodium (COLACE) 100 MG capsule Take 1 capsule (100 mg total) by mouth 2 (two) times daily as needed for Constipation.  0    furosemide (LASIX) 20 MG tablet Take 20 mg by mouth daily as needed.      lisinopriL (PRINIVIL,ZESTRIL) 2.5 MG tablet TAKE 1 TABLET (2.5 MG TOTAL) BY MOUTH ONCE DAILY. 90 tablet 0    metoprolol succinate (TOPROL-XL) 25 MG 24 hr tablet TAKE 1/2 TABLET (12.5 MG TOTAL) BY MOUTH ONCE DAILY.- 45 tablet 0    oxybutynin (DITROPAN) 5 MG Tab TAKE 1 TABLET (5 MG TOTAL) BY MOUTH EVERY EVENING. 90 tablet 3    pregabalin (LYRICA) 50 MG capsule Take 1 capsule (50 mg total) by mouth 2 (two) times daily. 60 capsule 2     No current facility-administered medications for this visit.       Family History   Problem Relation Age of Onset    Cancer Mother         breast    Heart disease Mother     Heart disease Father     Stroke Father     Alzheimer's disease Brother     Arthritis Brother         back and neck       Social History     Socioeconomic History    Marital status:    Tobacco Use    Smoking status: Former     Current packs/day: 0.00     Average packs/day: 2.5 packs/day for 30.0 years (75.0 ttl pk-yrs)     Types: Cigarettes     Start date: 1955     Quit date: 1985     Years since quittin.9    Smokeless tobacco: Never   Substance and Sexual Activity    Alcohol use: Yes     Alcohol/week: 0.0 standard drinks of  "alcohol    Drug use: No    Sexual activity: Not Currently         Objective:    BP (!) 107/50   Pulse 81   Ht 6' 2" (1.88 m)   Wt 89.4 kg (197 lb 1.5 oz)   BMI 25.31 kg/m²   Physical Exam  Constitutional:       Appearance: Normal appearance.   HENT:      Head: Normocephalic and atraumatic.   Eyes:      Extraocular Movements: Extraocular movements intact.   Cardiovascular:      Rate and Rhythm: Normal rate.   Pulmonary:      Effort: Pulmonary effort is normal.   Abdominal:      General: Abdomen is flat.   Musculoskeletal:         General: Normal range of motion.   Skin:     General: Skin is warm and dry.   Neurological:      General: No focal deficit present.      Mental Status: He is alert and oriented to person, place, and time. Mental status is at baseline.      Sensory: Sensory deficit present.      Motor: Weakness present.      Gait: Gait abnormal.      Comments: Rolling walker for assistance with ambulation   Psychiatric:         Mood and Affect: Mood normal.         Behavior: Behavior normal.         Thought Content: Thought content normal.        Ortho Exam       Assessment:       ICD-10-CM ICD-9-CM    1. Meralgia paresthetica of both lower extremities  G57.13 355.1 pregabalin (LYRICA) 50 MG capsule      Ambulatory referral/consult to Physical/Occupational Therapy      CANCELED: Ambulatory referral/consult to Physical/Occupational Therapy      2. Decreased activities of daily living (ADL)  Z78.9 V49.89 Ambulatory referral/consult to Physical/Occupational Therapy      CANCELED: Ambulatory referral/consult to Physical/Occupational Therapy      3. Peripheral polyneuropathy  G62.9 356.9             Plan:   1. Meralgia paresthetica of both lower extremities  Assessment & Plan:  Swab gabapentin 300 b.i.d. for Lyrica 50 mg b.i.d..    Lidocaine patch to both lateral thighs  Physical therapy for stretching for meralgia paresthetica to be added to his current ongoing therapy.    Count counseled on modifications " including weight loss, avoiding tight fitting clothing or tight-fitting belts.    Return to clinic in 2 months.    Orders:  -     pregabalin (LYRICA) 50 MG capsule; Take 1 capsule (50 mg total) by mouth 2 (two) times daily.  Dispense: 60 capsule; Refill: 2  -     Cancel: Ambulatory referral/consult to Physical/Occupational Therapy; Future; Expected date: 11/03/2023  -     Ambulatory referral/consult to Physical/Occupational Therapy; Future; Expected date: 11/03/2023    2. Decreased activities of daily living (ADL)  Assessment & Plan:  Physical therapy evaluate and treat   Continue home exercise program with video instruction as well as group therapy at [facility].    Orders:  -     Cancel: Ambulatory referral/consult to Physical/Occupational Therapy; Future; Expected date: 11/03/2023  -     Ambulatory referral/consult to Physical/Occupational Therapy; Future; Expected date: 11/03/2023    3. Peripheral polyneuropathy  Assessment & Plan:  Discussed the role of Qutenza and elected to hold off for now.  Can   Continue hydrotherapy with electrical stim as ordered by chiropractics.  This is apparently controlling his distal lower extremity neuropathy symptoms for now.  Gabapentin swabbed for Lyrica 50 mg b.i.d..    Return to clinic in 2 months             Sherif Turner MD  Physical Medicine & Rehabilitation     Disclaimer:  This note may have been prepared using voice recognition software, it may have not been extensively proofed, as such there could be errors within the text such as sound alike errors.  Contact the author of this note for clarification.

## 2023-10-27 NOTE — ASSESSMENT & PLAN NOTE
Discussed the role of Qutenza and elected to hold off for now.  Can   Continue hydrotherapy with electrical stim as ordered by chiropractics.  This is apparently controlling his distal lower extremity neuropathy symptoms for now.  Gabapentin swabbed for Lyrica 50 mg b.i.d..    Return to clinic in 2 months

## 2023-11-02 ENCOUNTER — OFFICE VISIT (OUTPATIENT)
Dept: HOME HEALTH SERVICES | Facility: CLINIC | Age: 88
End: 2023-11-02
Payer: MEDICARE

## 2023-11-02 VITALS
SYSTOLIC BLOOD PRESSURE: 167 MMHG | HEIGHT: 74 IN | DIASTOLIC BLOOD PRESSURE: 80 MMHG | HEART RATE: 60 BPM | OXYGEN SATURATION: 98 % | BODY MASS INDEX: 24.51 KG/M2 | WEIGHT: 191 LBS

## 2023-11-02 DIAGNOSIS — I42.9 CARDIOMYOPATHY, UNSPECIFIED TYPE: ICD-10-CM

## 2023-11-02 DIAGNOSIS — N18.31 STAGE 3A CHRONIC KIDNEY DISEASE: ICD-10-CM

## 2023-11-02 DIAGNOSIS — J84.10 PULMONARY GRANULOMA: ICD-10-CM

## 2023-11-02 DIAGNOSIS — I73.9 PAD (PERIPHERAL ARTERY DISEASE): ICD-10-CM

## 2023-11-02 DIAGNOSIS — I70.0 AORTIC ATHEROSCLEROSIS: ICD-10-CM

## 2023-11-02 DIAGNOSIS — H90.5 SENSORINEURAL HEARING LOSS (SNHL), UNSPECIFIED LATERALITY: ICD-10-CM

## 2023-11-02 DIAGNOSIS — Z00.00 ENCOUNTER FOR MEDICARE ANNUAL WELLNESS EXAM: ICD-10-CM

## 2023-11-02 DIAGNOSIS — E03.4 HYPOTHYROIDISM DUE TO ACQUIRED ATROPHY OF THYROID: ICD-10-CM

## 2023-11-02 DIAGNOSIS — R53.1 GENERALIZED WEAKNESS: ICD-10-CM

## 2023-11-02 DIAGNOSIS — E78.2 MIXED HYPERLIPIDEMIA: ICD-10-CM

## 2023-11-02 DIAGNOSIS — Z00.00 ENCOUNTER FOR PREVENTIVE HEALTH EXAMINATION: Primary | ICD-10-CM

## 2023-11-02 DIAGNOSIS — R26.9 GAIT DISTURBANCE: ICD-10-CM

## 2023-11-02 DIAGNOSIS — F32.9 REACTIVE DEPRESSION: ICD-10-CM

## 2023-11-02 DIAGNOSIS — I50.42 CHRONIC COMBINED SYSTOLIC AND DIASTOLIC CONGESTIVE HEART FAILURE: ICD-10-CM

## 2023-11-02 DIAGNOSIS — I10 ESSENTIAL HYPERTENSION: ICD-10-CM

## 2023-11-02 DIAGNOSIS — I65.23 BILATERAL CAROTID ARTERY STENOSIS: ICD-10-CM

## 2023-11-02 DIAGNOSIS — G62.9 PERIPHERAL POLYNEUROPATHY: ICD-10-CM

## 2023-11-02 PROCEDURE — 1160F RVW MEDS BY RX/DR IN RCRD: CPT | Mod: CPTII,S$GLB,, | Performed by: NURSE PRACTITIONER

## 2023-11-02 PROCEDURE — 1159F MED LIST DOCD IN RCRD: CPT | Mod: CPTII,S$GLB,, | Performed by: NURSE PRACTITIONER

## 2023-11-02 PROCEDURE — G0439 PR MEDICARE ANNUAL WELLNESS SUBSEQUENT VISIT: ICD-10-PCS | Mod: S$GLB,,, | Performed by: NURSE PRACTITIONER

## 2023-11-02 PROCEDURE — 3288F FALL RISK ASSESSMENT DOCD: CPT | Mod: CPTII,S$GLB,, | Performed by: NURSE PRACTITIONER

## 2023-11-02 PROCEDURE — G0439 PPPS, SUBSEQ VISIT: HCPCS | Mod: S$GLB,,, | Performed by: NURSE PRACTITIONER

## 2023-11-02 PROCEDURE — 3288F PR FALLS RISK ASSESSMENT DOCUMENTED: ICD-10-PCS | Mod: CPTII,S$GLB,, | Performed by: NURSE PRACTITIONER

## 2023-11-02 PROCEDURE — 1159F PR MEDICATION LIST DOCUMENTED IN MEDICAL RECORD: ICD-10-PCS | Mod: CPTII,S$GLB,, | Performed by: NURSE PRACTITIONER

## 2023-11-02 PROCEDURE — 1101F PR PT FALLS ASSESS DOC 0-1 FALLS W/OUT INJ PAST YR: ICD-10-PCS | Mod: CPTII,S$GLB,, | Performed by: NURSE PRACTITIONER

## 2023-11-02 PROCEDURE — 1160F PR REVIEW ALL MEDS BY PRESCRIBER/CLIN PHARMACIST DOCUMENTED: ICD-10-PCS | Mod: CPTII,S$GLB,, | Performed by: NURSE PRACTITIONER

## 2023-11-02 PROCEDURE — 1101F PT FALLS ASSESS-DOCD LE1/YR: CPT | Mod: CPTII,S$GLB,, | Performed by: NURSE PRACTITIONER

## 2023-11-05 PROBLEM — J90 PLEURAL EFFUSION: Status: RESOLVED | Noted: 2018-04-27 | Resolved: 2023-11-05

## 2023-11-05 PROBLEM — F10.20 UNCOMPLICATED ALCOHOL DEPENDENCE: Status: RESOLVED | Noted: 2020-06-07 | Resolved: 2023-11-05

## 2023-11-05 NOTE — PATIENT INSTRUCTIONS
Counseling and Referral of Other Preventative  (Italic type indicates deductible and co-insurance are waived)    Patient Name: Toni Uriarte  Today's Date: 11/5/2023    Health Maintenance       Date Due Completion Date    TETANUS VACCINE Never done ---    RSV Vaccine (Age 60+) (1 - 1-dose 60+ series) Never done ---    COVID-19 Vaccine (5 - 2023-24 season) 09/01/2023 10/12/2022    Lipid Panel 03/25/2024 3/25/2019        No orders of the defined types were placed in this encounter.      The following information is provided to all patients.  This information is to help you find resources for any of the problems found today that may be affecting your health:                Living healthy guide: www.[facility].[place].gov      Understanding Diabetes: www.diabetes.org      Eating healthy: www.cdc.gov/healthyweight      [facility] home safety checklist: www.cdc.gov/steadi/patient.html      Agency on Aging: www.goea.[place].gov      Alcoholics anonymous (AA): www.aa.org      Physical Activity: www.[place].nih.gov/ff0nmkw      Tobacco use: www.quitwithusla.org

## 2023-11-05 NOTE — PROGRESS NOTES
"  Toni Uriarte presented for a  Medicare AWV and comprehensive Health Risk Assessment today. The following components were reviewed and updated:    Medical history  Family History  Social history  Allergies and Current Medications  Health Risk Assessment  Health Maintenance  Care Team         ** See Completed Assessments for Annual Wellness Visit within the encounter summary.**         The following assessments were completed:  Living Situation  CAGE  Depression Screening  Timed Get Up and Go  Whisper Test  Cognitive Function Screening  Nutrition Screening  ADL Screening  PAQ Screening        Vitals:    11/02/23 1031   BP: (!) 167/80   Pulse: 60   SpO2: 98%   Weight: 86.6 kg (191 lb)   Height: 6' 2" (1.88 m)     Body mass index is 24.52 kg/m².  Physical Exam  Constitutional:       Appearance: Normal appearance.   HENT:      Head: Normocephalic and atraumatic.      Nose: Nose normal.   Eyes:      Extraocular Movements: Extraocular movements intact.      Pupils: Pupils are equal, round, and reactive to light.   Cardiovascular:      Rate and Rhythm: Normal rate and regular rhythm.      Pulses: Normal pulses.      Heart sounds: Normal heart sounds.   Pulmonary:      Effort: Pulmonary effort is normal.      Breath sounds: Normal breath sounds.   Musculoskeletal:      Cervical back: Normal range of motion and neck supple.   Neurological:      General: No focal deficit present.      Mental Status: He is alert and oriented to person, place, and time.               Diagnoses and health risks identified today and associated recommendations/orders:    1. Encounter for preventive health examination  Awv completed      2. Encounter for Medicare annual wellness exam  - Ambulatory Referral/Consult to Enhanced Annual Wellness Visit (eAWV)    3. Pulmonary granuloma  Chronic and stable. Continue current treatment. Follow with PCP.    4. Stage 3a chronic kidney disease  Chronic and stable. Continue current treatment. Follow " with PCP.  Followed with labs     5. Reactive depression  Chronic and stable. Continue current treatment. Follow with PCP.  On wellbutrin     6. Peripheral polyneuropathy  Chronic and stable. Continue current treatment. Follow with PCP.  On lyrica now     7. Sensorineural hearing loss (SNHL), unspecified laterality  .Chronic and stable. Continue current treatment. Follow with PCP.  Hearing aids        8. Aortic atherosclerosis  Chronic and stable. Continue current treatment. Follow with PCP.      9. Bilateral carotid artery stenosis  Chronic and stable. Continue current treatment. Follow with PCP.      10. Cardiomyopathy, unspecified type  Chronic and stable. Continue current treatment. Follow with PCP.      11. Chronic combined systolic and diastolic congestive heart failure  Chronic and stable. Continue current treatment. Follow with PCP.      12. Essential hypertension  Chronic and stable. Continue current treatment. Follow with PCP.      13. Mixed hyperlipidemia  Chronic and stable. Continue current treatment. Follow with PCP.    14. PAD (peripheral artery disease)  Chronic and stable. Continue current treatment. Follow with PCP.      15. Hypothyroidism due to acquired atrophy of thyroid  Chronic and stable. Continue current treatment. Follow with PCP.  On replacement      16. Gait disturbance  Chronic and stable. Continue current treatment. Follow with PCP.  Using walker and wheelchair for long distance    17. Generalized weakness  Chronic and stable. Continue current treatment. Follow with PCP.      Provided Toni Chavez with a 5-10 year written screening schedule and personal prevention plan. Recommendations were developed using the USPSTF age appropriate recommendations. Education, counseling, and referrals were provided as needed. After Visit Summary printed and given to patient which includes a list of additional screenings\tests needed.    Fu in 1 yr for Ruthy Kessler NP  I offered to  discuss advanced care planning, including how to pick a person who would make decisions for you if you were unable to make them for yourself, called a health care power of , and what kind of decisions you might make such as use of life sustaining treatments such as ventilators and tube feeding when faced with a life limiting illness recorded on a living will that they will need to know. (How you want to be cared for as you near the end of your natural life)     X Patient is interested in learning more about how to make advanced directives.  I provided them paperwork and offered to discuss this with them.

## 2023-11-26 PROBLEM — Z71.89 ACP (ADVANCE CARE PLANNING): Status: ACTIVE | Noted: 2023-11-26

## 2023-11-26 PROBLEM — Z66 DNR (DO NOT RESUSCITATE): Status: ACTIVE | Noted: 2023-11-26

## 2024-08-22 PROBLEM — R07.89 OTHER CHEST PAIN: Status: ACTIVE | Noted: 2024-08-22

## 2024-08-22 PROBLEM — J90 PLEURAL EFFUSION: Status: ACTIVE | Noted: 2024-08-22

## 2024-08-22 PROBLEM — R07.9 CHEST PAIN: Status: ACTIVE | Noted: 2024-08-22

## 2024-08-22 PROBLEM — E87.1 HYPONATREMIA: Status: ACTIVE | Noted: 2024-08-22

## 2024-08-22 PROBLEM — I50.9 ACUTE ON CHRONIC CONGESTIVE HEART FAILURE: Status: ACTIVE | Noted: 2024-08-22

## 2024-08-22 PROBLEM — R54 AGE-RELATED PHYSICAL DEBILITY: Status: ACTIVE | Noted: 2024-08-22

## 2024-08-22 PROBLEM — R11.2 NAUSEA AND VOMITING: Status: ACTIVE | Noted: 2024-08-22

## 2024-09-08 PROBLEM — I50.22 CHRONIC HFREF (HEART FAILURE WITH REDUCED EJECTION FRACTION): Status: ACTIVE | Noted: 2018-04-27

## 2024-09-08 PROBLEM — R53.81 DEBILITY: Status: RESOLVED | Noted: 2018-04-30 | Resolved: 2024-09-08

## 2024-09-08 PROBLEM — N40.0 BPH (BENIGN PROSTATIC HYPERPLASIA): Status: ACTIVE | Noted: 2024-09-08

## 2024-09-08 PROBLEM — K21.9 GERD (GASTROESOPHAGEAL REFLUX DISEASE): Status: ACTIVE | Noted: 2024-09-08

## 2024-09-08 PROBLEM — E03.9 HYPOTHYROIDISM: Status: ACTIVE | Noted: 2024-09-08

## 2024-09-08 PROBLEM — R53.81 DEBILITY: Status: ACTIVE | Noted: 2018-04-30

## 2024-09-08 PROBLEM — J18.9 PNEUMONIA INVOLVING LEFT LUNG: Status: ACTIVE | Noted: 2024-09-08

## 2024-09-08 PROBLEM — G47.00 INSOMNIA: Status: ACTIVE | Noted: 2024-09-08

## 2024-09-08 PROBLEM — D64.9 ANEMIA: Status: ACTIVE | Noted: 2024-09-08

## 2024-09-08 PROBLEM — N17.9 AKI (ACUTE KIDNEY INJURY): Status: ACTIVE | Noted: 2024-09-08

## 2024-09-08 PROBLEM — E83.42 HYPOMAGNESEMIA: Status: ACTIVE | Noted: 2024-09-08

## 2024-09-11 PROBLEM — J69.0 ASPIRATION PNEUMONIA OF BOTH LUNGS DUE TO GASTRIC SECRETIONS: Status: ACTIVE | Noted: 2024-09-08

## 2024-09-13 PROBLEM — K59.09 OTHER CONSTIPATION: Status: ACTIVE | Noted: 2024-09-13

## 2024-09-17 ENCOUNTER — OUTPATIENT CASE MANAGEMENT (OUTPATIENT)
Dept: ADMINISTRATIVE | Facility: OTHER | Age: 89
End: 2024-09-17
Payer: MEDICARE

## 2024-09-17 NOTE — PROGRESS NOTES
Outpatient Care Management  Patient Not Qualified    Patient: Toni Urirate  MRN:  [id number]  Date of Service:  9/17/2024  Completed by:  Isamar Bo RN    Chief Complaint   Patient presents with    Case Closure     Hospice       Patient Summary           Reason Not Qualified:    Other (see comment)  Needs met by others  Under hospice care.